# Patient Record
Sex: FEMALE | Race: WHITE | NOT HISPANIC OR LATINO | URBAN - METROPOLITAN AREA
[De-identification: names, ages, dates, MRNs, and addresses within clinical notes are randomized per-mention and may not be internally consistent; named-entity substitution may affect disease eponyms.]

---

## 2019-02-14 ENCOUNTER — INPATIENT (INPATIENT)
Facility: HOSPITAL | Age: 84
LOS: 8 days | Discharge: HOME | DRG: 377 | End: 2019-02-23
Attending: INTERNAL MEDICINE | Admitting: INTERNAL MEDICINE
Payer: MEDICARE

## 2019-02-14 VITALS
OXYGEN SATURATION: 96 % | DIASTOLIC BLOOD PRESSURE: 74 MMHG | HEART RATE: 80 BPM | TEMPERATURE: 98 F | SYSTOLIC BLOOD PRESSURE: 157 MMHG | RESPIRATION RATE: 20 BRPM

## 2019-02-14 DIAGNOSIS — R47.02 DYSPHASIA: ICD-10-CM

## 2019-02-14 LAB
ALBUMIN SERPL ELPH-MCNC: 3.3 G/DL — SIGNIFICANT CHANGE UP (ref 3.3–5)
ALP SERPL-CCNC: 324 U/L — HIGH (ref 40–120)
ALT FLD-CCNC: 32 U/L — SIGNIFICANT CHANGE UP (ref 10–45)
ANION GAP SERPL CALC-SCNC: 13 MMOL/L — SIGNIFICANT CHANGE UP (ref 5–17)
APPEARANCE UR: ABNORMAL
AST SERPL-CCNC: 53 U/L — HIGH (ref 10–40)
BASOPHILS # BLD AUTO: 0 K/UL — SIGNIFICANT CHANGE UP (ref 0–0.2)
BASOPHILS NFR BLD AUTO: 0 % — SIGNIFICANT CHANGE UP (ref 0–2)
BILIRUB SERPL-MCNC: 0.4 MG/DL — SIGNIFICANT CHANGE UP (ref 0.2–1.2)
BILIRUB UR-MCNC: NEGATIVE — SIGNIFICANT CHANGE UP
BUN SERPL-MCNC: 19 MG/DL — SIGNIFICANT CHANGE UP (ref 7–23)
CALCIUM SERPL-MCNC: 8.8 MG/DL — SIGNIFICANT CHANGE UP (ref 8.4–10.5)
CHLORIDE SERPL-SCNC: 104 MMOL/L — SIGNIFICANT CHANGE UP (ref 96–108)
CO2 SERPL-SCNC: 28 MMOL/L — SIGNIFICANT CHANGE UP (ref 22–31)
COLOR SPEC: YELLOW — SIGNIFICANT CHANGE UP
CREAT SERPL-MCNC: 0.79 MG/DL — SIGNIFICANT CHANGE UP (ref 0.5–1.3)
DIFF PNL FLD: NEGATIVE — SIGNIFICANT CHANGE UP
EOSINOPHIL # BLD AUTO: 0 K/UL — SIGNIFICANT CHANGE UP (ref 0–0.5)
EOSINOPHIL NFR BLD AUTO: 0.6 % — SIGNIFICANT CHANGE UP (ref 0–6)
GLUCOSE SERPL-MCNC: 109 MG/DL — HIGH (ref 70–99)
GLUCOSE UR QL: NEGATIVE — SIGNIFICANT CHANGE UP
HCT VFR BLD CALC: 34.4 % — LOW (ref 34.5–45)
HCT VFR BLD CALC: 38 % — SIGNIFICANT CHANGE UP (ref 34.5–45)
HGB BLD-MCNC: 11.7 G/DL — SIGNIFICANT CHANGE UP (ref 11.5–15.5)
HGB BLD-MCNC: 12.4 G/DL — SIGNIFICANT CHANGE UP (ref 11.5–15.5)
KETONES UR-MCNC: NEGATIVE — SIGNIFICANT CHANGE UP
LEUKOCYTE ESTERASE UR-ACNC: NEGATIVE — SIGNIFICANT CHANGE UP
LYMPHOCYTES # BLD AUTO: 0.6 K/UL — LOW (ref 1–3.3)
LYMPHOCYTES # BLD AUTO: 7.7 % — LOW (ref 13–44)
MAGNESIUM SERPL-MCNC: 1.8 MG/DL — SIGNIFICANT CHANGE UP (ref 1.6–2.6)
MCHC RBC-ENTMCNC: 31.7 PG — SIGNIFICANT CHANGE UP (ref 27–34)
MCHC RBC-ENTMCNC: 32.8 GM/DL — SIGNIFICANT CHANGE UP (ref 32–36)
MCHC RBC-ENTMCNC: 33.2 PG — SIGNIFICANT CHANGE UP (ref 27–34)
MCHC RBC-ENTMCNC: 33.9 GM/DL — SIGNIFICANT CHANGE UP (ref 32–36)
MCV RBC AUTO: 96.7 FL — SIGNIFICANT CHANGE UP (ref 80–100)
MCV RBC AUTO: 97.9 FL — SIGNIFICANT CHANGE UP (ref 80–100)
MONOCYTES # BLD AUTO: 0.4 K/UL — SIGNIFICANT CHANGE UP (ref 0–0.9)
MONOCYTES NFR BLD AUTO: 4.9 % — SIGNIFICANT CHANGE UP (ref 2–14)
NEUTROPHILS # BLD AUTO: 6.8 K/UL — SIGNIFICANT CHANGE UP (ref 1.8–7.4)
NEUTROPHILS NFR BLD AUTO: 86.8 % — HIGH (ref 43–77)
NITRITE UR-MCNC: NEGATIVE — SIGNIFICANT CHANGE UP
OB PNL STL: POSITIVE
PH UR: 6 — SIGNIFICANT CHANGE UP (ref 5–8)
PHOSPHATE SERPL-MCNC: 2.9 MG/DL — SIGNIFICANT CHANGE UP (ref 2.5–4.5)
PLATELET # BLD AUTO: 165 K/UL — SIGNIFICANT CHANGE UP (ref 150–400)
PLATELET # BLD AUTO: 210 K/UL — SIGNIFICANT CHANGE UP (ref 150–400)
POTASSIUM SERPL-MCNC: 3.9 MMOL/L — SIGNIFICANT CHANGE UP (ref 3.5–5.3)
POTASSIUM SERPL-SCNC: 3.9 MMOL/L — SIGNIFICANT CHANGE UP (ref 3.5–5.3)
PROT SERPL-MCNC: 6.8 G/DL — SIGNIFICANT CHANGE UP (ref 6–8.3)
PROT UR-MCNC: ABNORMAL
RBC # BLD: 3.51 M/UL — LOW (ref 3.8–5.2)
RBC # BLD: 3.93 M/UL — SIGNIFICANT CHANGE UP (ref 3.8–5.2)
RBC # FLD: 12.5 % — SIGNIFICANT CHANGE UP (ref 10.3–14.5)
RBC # FLD: 12.6 % — SIGNIFICANT CHANGE UP (ref 10.3–14.5)
SODIUM SERPL-SCNC: 145 MMOL/L — SIGNIFICANT CHANGE UP (ref 135–145)
SP GR SPEC: 1.03 — HIGH (ref 1.01–1.02)
T3 SERPL-MCNC: 51 NG/DL — LOW (ref 80–200)
T4 AB SER-ACNC: 7.1 UG/DL — SIGNIFICANT CHANGE UP (ref 4.6–12)
TSH SERPL-MCNC: 5.7 UIU/ML — HIGH (ref 0.27–4.2)
UROBILINOGEN FLD QL: ABNORMAL
WBC # BLD: 5 K/UL — SIGNIFICANT CHANGE UP (ref 3.8–10.5)
WBC # BLD: 7.8 K/UL — SIGNIFICANT CHANGE UP (ref 3.8–10.5)
WBC # FLD AUTO: 5 K/UL — SIGNIFICANT CHANGE UP (ref 3.8–10.5)
WBC # FLD AUTO: 7.8 K/UL — SIGNIFICANT CHANGE UP (ref 3.8–10.5)

## 2019-02-14 PROCEDURE — 93970 EXTREMITY STUDY: CPT | Mod: 26

## 2019-02-14 PROCEDURE — 99285 EMERGENCY DEPT VISIT HI MDM: CPT | Mod: 25

## 2019-02-14 PROCEDURE — 88312 SPECIAL STAINS GROUP 1: CPT | Mod: 26

## 2019-02-14 PROCEDURE — 71045 X-RAY EXAM CHEST 1 VIEW: CPT | Mod: 26

## 2019-02-14 PROCEDURE — 88305 TISSUE EXAM BY PATHOLOGIST: CPT | Mod: 26

## 2019-02-14 PROCEDURE — 71260 CT THORAX DX C+: CPT | Mod: 26

## 2019-02-14 PROCEDURE — 74177 CT ABD & PELVIS W/CONTRAST: CPT | Mod: 26

## 2019-02-14 RX ORDER — PANTOPRAZOLE SODIUM 20 MG/1
8 TABLET, DELAYED RELEASE ORAL
Qty: 80 | Refills: 0 | Status: DISCONTINUED | OUTPATIENT
Start: 2019-02-14 | End: 2019-02-19

## 2019-02-14 RX ORDER — SODIUM CHLORIDE 9 MG/ML
1000 INJECTION INTRAMUSCULAR; INTRAVENOUS; SUBCUTANEOUS ONCE
Qty: 0 | Refills: 0 | Status: COMPLETED | OUTPATIENT
Start: 2019-02-14 | End: 2019-02-14

## 2019-02-14 RX ORDER — NYSTATIN 500MM UNIT
500000 POWDER (EA) MISCELLANEOUS
Qty: 0 | Refills: 0 | Status: DISCONTINUED | OUTPATIENT
Start: 2019-02-14 | End: 2019-02-22

## 2019-02-14 RX ORDER — LEVOTHYROXINE SODIUM 125 MCG
50 TABLET ORAL DAILY
Qty: 0 | Refills: 0 | Status: DISCONTINUED | OUTPATIENT
Start: 2019-02-14 | End: 2019-02-23

## 2019-02-14 RX ORDER — SUCRALFATE 1 G
1 TABLET ORAL
Qty: 0 | Refills: 0 | Status: DISCONTINUED | OUTPATIENT
Start: 2019-02-14 | End: 2019-02-23

## 2019-02-14 RX ORDER — HEPARIN SODIUM 5000 [USP'U]/ML
5000 INJECTION INTRAVENOUS; SUBCUTANEOUS EVERY 12 HOURS
Qty: 0 | Refills: 0 | Status: DISCONTINUED | OUTPATIENT
Start: 2019-02-14 | End: 2019-02-23

## 2019-02-14 RX ORDER — PANTOPRAZOLE SODIUM 20 MG/1
80 TABLET, DELAYED RELEASE ORAL ONCE
Qty: 0 | Refills: 0 | Status: COMPLETED | OUTPATIENT
Start: 2019-02-14 | End: 2019-02-14

## 2019-02-14 RX ADMIN — Medication 1 GRAM(S): at 20:38

## 2019-02-14 RX ADMIN — PANTOPRAZOLE SODIUM 10 MG/HR: 20 TABLET, DELAYED RELEASE ORAL at 23:39

## 2019-02-14 RX ADMIN — SODIUM CHLORIDE 1000 MILLILITER(S): 9 INJECTION INTRAMUSCULAR; INTRAVENOUS; SUBCUTANEOUS at 03:46

## 2019-02-14 RX ADMIN — PANTOPRAZOLE SODIUM 10 MG/HR: 20 TABLET, DELAYED RELEASE ORAL at 10:54

## 2019-02-14 RX ADMIN — Medication 500000 UNIT(S): at 20:38

## 2019-02-14 RX ADMIN — HEPARIN SODIUM 5000 UNIT(S): 5000 INJECTION INTRAVENOUS; SUBCUTANEOUS at 20:38

## 2019-02-14 RX ADMIN — PANTOPRAZOLE SODIUM 80 MILLIGRAM(S): 20 TABLET, DELAYED RELEASE ORAL at 04:10

## 2019-02-14 NOTE — ED PROVIDER NOTE - PHYSICAL EXAMINATION
PGY1/MD Kennedy.   VITALS: reviewed  GEN: No apparent distress, A & O x 2, very thin lady,   HEAD/EYES: NC/AT, anicteric sclerae, +conjunctival pallor  ENT: mucus membranes moist, oropharynx WNL neck is supple  RESP: lungs CTA with equal breath sounds bilaterally, chest wall nontender and atraumatic  CV: heart with reg rhythm S1, S2, no murmur; distal pulses intact and symmetric bilaterally  ABDOMEN: normoactive bowel sounds, soft, nondistended, nontender, no palpable masses, + multiple surgical scars  MSK: extremities atraumatic and nontender, no edema, no asymmetry.   SKIN: warm, dry, no rash, no bruising, no cyanosis. color appropriate for ethnicity  NEURO: alert, mentating appropriately, no facial asymmetry.  PSYCH: Affect appropriate    Rectal Exam: no fissures, +hemorrhoids with no bleeding, +brown stool, no melena, no bright red blood. No remarkable tumor as far as finger's reach. small abrasion (not decubitus) is appreciated on left buttock. performed with [employer]

## 2019-02-14 NOTE — CONSULT NOTE ADULT - SUBJECTIVE AND OBJECTIVE BOX
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 Poor historian, information is from her son + his wife. Pt has been disoriented for the last 2 wks, yales and screams at night. Today, she started complaining difficulty swallowing and spitting out salivas (of note, the son says that the pt was able to drink water and took small breakfast this morning). Also, they notices that blood in toilet (not sure from urine or stool), which rashes her to the ED. Pt lost more than 10lbs over the last month.    PAST MEDICAL & SURGICAL HISTORY:  Stomach cancer: 2003  Kidney stone  Hypothyroid  History of ovarian cyst: 2009      MEDICATIONS  (STANDING):    MEDICATIONS  (PRN):      FAMILY HISTORY:      SOCIAL HISTORY:    [ ] Non-smoker  [ ] Smoker  [ ] Alcohol    Allergies    No Known Drug Allergies  shellfish (Anaphylaxis)    Intolerances    	    REVIEW OF SYSTEMS:  CONSTITUTIONAL: No fever, weight loss, or fatigue  EYES: No eye pain, visual disturbances, or discharge  ENT:  No difficulty hearing, tinnitus, vertigo; No sinus or throat pain  NECK: No pain or stiffness  RESPIRATORY: No cough, wheezing, chills or hemoptysis;+ Shortness of Breath  CARDIOVASCULAR: No chest pain, palpitations, passing out, dizziness, or leg swelling  GASTROINTESTINAL: No abdominal or epigastric pain. No nausea, vomiting, or hematemesis; No diarrhea or constipation. No melena or hematochezia.  GENITOURINARY: No dysuria, frequency, hematuria, or incontinence  NEUROLOGICAL: No headaches, memory loss, loss of strength, numbness, or tremors  SKIN: No itching, burning, rashes, or lesions   LYMPH Nodes: No enlarged glands  ENDOCRINE: No heat or cold intolerance; No hair loss  MUSCULOSKELETAL: No joint pain or swelling; No muscle, back, or extremity pain, + swelling  PSYCHIATRIC: No depression, anxiety, mood swings, or difficulty sleeping  HEME/LYMPH: No easy bruising, or bleeding gums  ALLERGY AND IMMUNOLOGIC: No hives or eczema	    [ ] All others negative	  [ ] Unable to obtain    PHYSICAL EXAM:  T(C): 36.8 (02-14-19 @ 06:19), Max: 36.8 (02-14-19 @ 01:38)  HR: 71 (02-14-19 @ 06:19) (71 - 80)  BP: 120/62 (02-14-19 @ 06:19) (120/62 - 157/74)  RR: 20 (02-14-19 @ 06:19) (20 - 20)  SpO2: 98% (02-14-19 @ 06:19) (96% - 98%)  Wt(kg): --  I&O's Summary      Appearance: Normal	  HEENT:   Normal oral mucosa, PERRL, EOMI	  Lymphatic: No lymphadenopathy  Cardiovascular: Normal S1 S2, No JVD,= murmurs,+ edema  Respiratory: Lungs clear to auscultation	  Psychiatry: A & O x 3, Mood & affect appropriate  Gastrointestinal:  Soft, Non-tender, + BS	  Skin: No rashes, No ecchymoses, No cyanosis	  Neurologic: Non-focal  Extremities: Normal range of motion, No clubbing, cyanosis .+  edema  Vascular: Peripheral pulses palpable 2+ bilaterally    TELEMETRY: 	    ECG:  	  RADIOLOGY:  OTHER: 	  	  LABS:	 	    CARDIAC MARKERS:                              12.4   7.8   )-----------( 210      ( 14 Feb 2019 03:31 )             38.0     02-14    145  |  104  |  19  ----------------------------<  109<H>  3.9   |  28  |  0.79    Ca    8.8      14 Feb 2019 03:31  Phos  2.9     02-14  Mg     1.8     02-14    TPro  6.8  /  Alb  3.3  /  TBili  0.4  /  DBili  x   /  AST  53<H>  /  ALT  32  /  AlkPhos  324<H>  02-14    proBNP: Serum Pro-Brain Natriuretic Peptide: 2551 pg/mL (02-14 @ 03:31)    Lipid Profile:   HgA1c:   TSH:       PREVIOUS DIAGNOSTIC TESTING:    < from: CT Head No Cont (09.28.16 @ 07:20) >  IMPRESSION: No CT evidence of acute intracranial hemorrhage, brain edema,   mass effect or acute territorial infarct. Soft tissue swelling of the   left parietal region. The underlying calvarium is intact.    < end of copied text > (14 Feb 2019 07:40)      PAST MEDICAL & SURGICAL HISTORY:  Stomach cancer: 2003  Kidney stone  Hypothyroid  History of ovarian cyst: 2009      MEDICATIONS  (STANDING):  heparin  Injectable 5000 Unit(s) SubCutaneous every 12 hours  levothyroxine 50 MICROGram(s) Oral daily  pantoprazole Infusion 8 mG/Hr (10 mL/Hr) IV Continuous <Continuous>      Allergies    No Known Drug Allergies  shellfish (Anaphylaxis)    Intolerances        SOCIAL HISTORY:  Denies ETOh,Smoking,     FAMILY HISTORY:      REVIEW OF SYSTEMS:    CONSTITUTIONAL: No weakness, fevers or chills  EYES/ENT: No visual changes;  No vertigo or throat pain   NECK: No pain or stiffness  RESPIRATORY: No cough, wheezing, hemoptysis; No shortness of breath  CARDIOVASCULAR: No chest pain or palpitations  GASTROINTESTINAL: No abdominal or epigastric pain. No nausea, vomiting, or hematemesis; No diarrhea or constipation. No melena or hematochezia.  GENITOURINARY: No dysuria, frequency or hematuria  NEUROLOGICAL: No numbness or weakness  SKIN: No itching, burning, rashes, or lesions   All other review of systems is negative unless indicated above.    VITAL:  T(C): , Max: 36.8 (02-14-19 @ 01:38)  T(F): , Max: 98.2 (02-14-19 @ 01:38)  HR: 81 (02-14-19 @ 08:25)  BP: 106/60 (02-14-19 @ 08:25)  BP(mean): --  RR: 18 (02-14-19 @ 08:25)  SpO2: 97% (02-14-19 @ 08:25)  Wt(kg): --    I and O's:        PHYSICAL EXAM:    Constitutional: NAD  HEENT: PERRLA,   Neck: No JVD  Respiratory: CTA B/L  Cardiovascular: S1 and S2  Gastrointestinal: BS+, soft, NT/ND  Extremities: No peripheral edema  Neurological: A/O x 3, no focal deficits  Psychiatric: Normal mood, normal affect  : No Duron  Skin: No rashes  Access: Not applicable  Back: No CVA tenderness    LABS:                        12.4   7.8   )-----------( 210      ( 14 Feb 2019 03:31 )             38.0     02-14    145  |  104  |  19  ----------------------------<  109<H>  3.9   |  28  |  0.79    Ca    8.8      14 Feb 2019 03:31  Phos  2.9     02-14  Mg     1.8     02-14    TPro  6.8  /  Alb  3.3  /  TBili  0.4  /  DBili  x   /  AST  53<H>  /  ALT  32  /  AlkPhos  324<H>  02-14          RADIOLOGY & ADDITIONAL STUDIES:

## 2019-02-14 NOTE — PRE-ANESTHESIA EVALUATION ADULT - NSANTHOSAYNRD_GEN_A_CORE
No. ZE screening performed.  STOP BANG Legend: 0-2 = LOW Risk; 3-4 = INTERMEDIATE Risk; 5-8 = HIGH Risk

## 2019-02-14 NOTE — CONSULT NOTE ADULT - ASSESSMENT
89 y/o female hx of gastric cancer and hypothyroid admitted with dysphagia/  acute GI bleed and weight loss     1- GI Bleed : monitor H/H   PPI IV   avoid NSAIDS     2- dysphagia : planned EGD     3- hypothyroid: cont meds   check TSH  free t4    4- Anemia : monitor for now     PAS 89 y/o female hx of gastric cancer and hypothyroid admitted with dysphagia/  acute GI bleed and weight loss     1- GI Bleed : monitor H/H   PPI IV   avoid NSAIDS     2- dysphagia : planned EGD     3- hypothyroid: cont meds   check TSH  free t4    4- Anemia : monitor for now     5- edema : likely thrid spacing however consdier VA duplex r/o DVT     PAS

## 2019-02-14 NOTE — ED ADULT NURSE REASSESSMENT NOTE - NS ED NURSE REASSESS COMMENT FT1
Pt straight cathed for residual urine with NELLIE Waddell present to confirm sterility. Pt tolerated procedure well. Small amount of dark urine drained. UA and culture sent to lab. Linens fixed and diaper changed. Family at bedside.

## 2019-02-14 NOTE — ED ADULT NURSE NOTE - OBJECTIVE STATEMENT
89 y/o female presents to ED via EMS from home c/o difficulty swallowing since 11:45pm last night. Pt arrived with family at bedside. A&O x 2, disoriented to time though she seems confused. Pt son says she has been c/o difficulty swallowing hours after dinner. She has a history of throat discomfort s/p radiation for stomach cancer years ago. Family says for last few days, she has been more lethargic and weak. Also reports one episode of blood in toilet paper in commode yesterday. They say the last time she urinated was Tuesday. pt live sat home with her 95 year old  who assists her with ADLs. Upon arrival, pt O2 stable. Repeatedly touching napkins to tongue saying "I cant swallow." Appears frail. +2 pitting edema in b/l extremities which family says is baseline for her, though moreso now due to pt not elevating her legs. 89 y/o female presents to ED via EMS from home c/o difficulty swallowing since 11:45pm last night. Pt arrived with family at bedside. A&O x 2, disoriented to time though is confused. Pt son says she has been c/o difficulty swallowing hours after dinner. She has a history of throat discomfort s/p radiation for stomach cancer years ago. Family says for last few days, she has been more lethargic and weak. Also reports one episode of blood on toilet paper in bedside commode yesterday. Unsure if it was from stool or urine. They say the last time she urinated was Tuesday. pt lives at home with her 95 year old  who assists her with ADLs. Upon arrival, pt O2 stable. Repeatedly touching napkins to tongue saying "I cant swallow." Denying chest pain, SOB, abdominal pain, n/v/d. Frail and cachexic appearing. +2 pitting edema in b/l extremities which family says is baseline for her, though more so recently due to pt not elevating her legs. Around 10mm stage 2 pressure ulcer noted to R buttock which family is aware of. No exudate or foul smell noted. Safety and comfort provided.

## 2019-02-14 NOTE — ED PROVIDER NOTE - OBJECTIVE STATEMENT
PGY1/MD Kennedy. 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 Poor historian, information is from her son + his wife. Pt has been disoriented for the last 2 wks, yales and screams at night. Today, she started complaining difficulty swallowing and spitting out salivas (of note, the son says that the pt can drink water). Also, they notices that blood in toilet (not sure from urine or stool), which rashes her to the ED. Pt lost more than 10lbs over the last month. PGY1/MD Kennedy. 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 Poor historian, information is from her son + his wife. Pt has been disoriented for the last 2 wks, yales and screams at night. Today, she started complaining difficulty swallowing and spitting out salivas (of note, the son says that the pt was able to drink water and took small breakfast this morning). Also, they notices that blood in toilet (not sure from urine or stool), which rashes her to the ED. Pt lost more than 10lbs over the last month. PGY1/MD Kennedy. 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 Poor historian, information is from her son + his wife. Pt has been disoriented for the last 2 wks, yales and screams at night. Today, she started complaining difficulty swallowing and spitting out salivas (of note, the son says that the pt was able to drink water and took small breakfast this morning). Also, they notices that blood in toilet (not sure from urine or stool), which rashes her to the ED. Pt lost more than 10lbs over the last month.

## 2019-02-14 NOTE — ED PROVIDER NOTE - CLINICAL SUMMARY MEDICAL DECISION MAKING FREE TEXT BOX
PGY1/MD Kennedy. 89 yo F with gastric cancer, 14 yrs ago, p/w decrased appetite x 2wks, delilium PGY1/MD Kennedy. 89 yo F with gastric cancer, 14 yrs ago, p/w decrased appetite x 2wks, dysphasia, delirium. No sings of choking or airway compromise. guanic, ua, cbc, cmp. Alvarez Mcrae DO: 89 yo F pmh gastric cancer, esophageal strictures, hypothyroidism with difficult swallowing and decreased appetite x 2 weeks. Associated with gradual cognitive  decline during same period of time. Family noted blood in toilet (unclear gu vs gi) which prompted ED visit. Pt awake in NAD. no focal deficit. dysphagia of unclear etiology, not tolerating po with wt loss per family. ?blood in stool Plan: labs, cxr. patient to be admitted for further management.

## 2019-02-14 NOTE — ED ADULT NURSE REASSESSMENT NOTE - NS ED NURSE REASSESS COMMENT FT1
Pt family left bedside. Pulled closer to nursing station to keep a closer eye on her for safety. pt currently sleeping. Awaiting bed upstairs.

## 2019-02-14 NOTE — ED ADULT NURSE NOTE - NSIMPLEMENTINTERV_GEN_ALL_ED
Implemented All Fall with Harm Risk Interventions:  [place] to call system. Call bell, personal items and telephone within reach. Instruct patient to call for assistance. Room bathroom lighting operational. Non-slip footwear when patient is off stretcher. Physically safe environment: no spills, clutter or unnecessary equipment. Stretcher in lowest position, wheels locked, appropriate side rails in place. Provide visual cue, wrist band, yellow gown, etc. Monitor gait and stability. Monitor for mental status changes and reorient to person, place, and time. Review medications for side effects contributing to fall risk. Reinforce activity limits and safety measures with patient and family. Provide visual clues: red socks.

## 2019-02-14 NOTE — H&P ADULT - HISTORY OF PRESENT ILLNESS
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 Poor historian, information is from her son + his wife. Pt has been disoriented for the last 2 wks, yales and screams at night. Today, she started complaining difficulty swallowing and spitting out salivas (of note, the son says that the pt was able to drink water and took small breakfast this morning). Also, they notices that blood in toilet (not sure from urine or stool), which rashes her to the ED. Pt lost more than 10lbs over the last month.    PAST MEDICAL & SURGICAL HISTORY:  Stomach cancer: 2003  Kidney stone  Hypothyroid  History of ovarian cyst: 2009      MEDICATIONS  (STANDING):    MEDICATIONS  (PRN):      FAMILY HISTORY:      SOCIAL HISTORY:    [ ] Non-smoker  [ ] Smoker  [ ] Alcohol    Allergies    No Known Drug Allergies  shellfish (Anaphylaxis)    Intolerances    	    REVIEW OF SYSTEMS:  CONSTITUTIONAL: No fever, weight loss, or fatigue  EYES: No eye pain, visual disturbances, or discharge  ENT:  No difficulty hearing, tinnitus, vertigo; No sinus or throat pain  NECK: No pain or stiffness  RESPIRATORY: No cough, wheezing, chills or hemoptysis;+ Shortness of Breath  CARDIOVASCULAR: No chest pain, palpitations, passing out, dizziness, or leg swelling  GASTROINTESTINAL: No abdominal or epigastric pain. No nausea, vomiting, or hematemesis; No diarrhea or constipation. No melena or hematochezia.  GENITOURINARY: No dysuria, frequency, hematuria, or incontinence  NEUROLOGICAL: No headaches, memory loss, loss of strength, numbness, or tremors  SKIN: No itching, burning, rashes, or lesions   LYMPH Nodes: No enlarged glands  ENDOCRINE: No heat or cold intolerance; No hair loss  MUSCULOSKELETAL: No joint pain or swelling; No muscle, back, or extremity pain, + swelling  PSYCHIATRIC: No depression, anxiety, mood swings, or difficulty sleeping  HEME/LYMPH: No easy bruising, or bleeding gums  ALLERGY AND IMMUNOLOGIC: No hives or eczema	    [ ] All others negative	  [ ] Unable to obtain    PHYSICAL EXAM:  T(C): 36.8 (02-14-19 @ 06:19), Max: 36.8 (02-14-19 @ 01:38)  HR: 71 (02-14-19 @ 06:19) (71 - 80)  BP: 120/62 (02-14-19 @ 06:19) (120/62 - 157/74)  RR: 20 (02-14-19 @ 06:19) (20 - 20)  SpO2: 98% (02-14-19 @ 06:19) (96% - 98%)  Wt(kg): --  I&O's Summary      Appearance: Normal	  HEENT:   Normal oral mucosa, PERRL, EOMI	  Lymphatic: No lymphadenopathy  Cardiovascular: Normal S1 S2, No JVD,= murmurs,+ edema  Respiratory: Lungs clear to auscultation	  Psychiatry: A & O x 3, Mood & affect appropriate  Gastrointestinal:  Soft, Non-tender, + BS	  Skin: No rashes, No ecchymoses, No cyanosis	  Neurologic: Non-focal  Extremities: Normal range of motion, No clubbing, cyanosis .+  edema  Vascular: Peripheral pulses palpable 2+ bilaterally    TELEMETRY: 	    ECG:  	  RADIOLOGY:  OTHER: 	  	  LABS:	 	    CARDIAC MARKERS:                              12.4   7.8   )-----------( 210      ( 14 Feb 2019 03:31 )             38.0     02-14    145  |  104  |  19  ----------------------------<  109<H>  3.9   |  28  |  0.79    Ca    8.8      14 Feb 2019 03:31  Phos  2.9     02-14  Mg     1.8     02-14    TPro  6.8  /  Alb  3.3  /  TBili  0.4  /  DBili  x   /  AST  53<H>  /  ALT  32  /  AlkPhos  324<H>  02-14    proBNP: Serum Pro-Brain Natriuretic Peptide: 2551 pg/mL (02-14 @ 03:31)    Lipid Profile:   HgA1c:   TSH:       PREVIOUS DIAGNOSTIC TESTING:    < from: CT Head No Cont (09.28.16 @ 07:20) >  IMPRESSION: No CT evidence of acute intracranial hemorrhage, brain edema,   mass effect or acute territorial infarct. Soft tissue swelling of the   left parietal region. The underlying calvarium is intact.    < end of copied text >

## 2019-02-14 NOTE — PROGRESS NOTE ADULT - SUBJECTIVE AND OBJECTIVE BOX
Pre-Endoscopy Evaluation      Referring Physician:  dr. miguelito delarosa                                  Procedure:  upper gastrointestinal endoscopy     Indication for Procedure: dysphagia    Pertinent History: 89y female with PMH of gastric cancer, s/p gastrectomy/XRT, esophagus stricture, Hypothyroidism p/w difficulty swallowing, decreased appetite x 2 weeks      Sedation by Anesthesia [x]    PAST MEDICAL & SURGICAL HISTORY:  Stomach cancer: 2003  Kidney stone  Hypothyroid  History of ovarian cyst: 2009  Dementia      PMH of Gastroparesis [ ]  Gastric Surgery [X]  Gastric Outlet Obstruction [ ]    Allergies:    No Known Drug Allergies  shellfish (Anaphylaxis)    Intolerances:    Latex allergy: [ ] yes [x] no    Medications:MEDICATIONS  (STANDING):  heparin  Injectable 5000 Unit(s) SubCutaneous every 12 hours  levothyroxine 50 MICROGram(s) Oral daily  pantoprazole Infusion 8 mG/Hr (10 mL/Hr) IV Continuous <Continuous>    MEDICATIONS  (PRN):      Smoking: [ ] yes  [x] no    AICD/PPM: [ ] yes   [x] no    Pertinent lab data:                        11.7   5.0   )-----------( 165      ( 14 Feb 2019 12:17 )             34.4     02-14    145  |  104  |  19  ----------------------------<  109<H>  3.9   |  28  |  0.79    Ca    8.8      14 Feb 2019 03:31  Phos  2.9     02-14  Mg     1.8     02-14    TPro  6.8  /  Alb  3.3  /  TBili  0.4  /  DBili  x   /  AST  53<H>  /  ALT  32  /  AlkPhos  324<H>  02-14          Physical Examination:       Daily   Vital Signs Last 24 Hrs  T(C): 36.4 (14 Feb 2019 08:25), Max: 36.8 (14 Feb 2019 01:38)  T(F): 97.5 (14 Feb 2019 08:25), Max: 98.2 (14 Feb 2019 01:38)  HR: 81 (14 Feb 2019 08:25) (70 - 81)  BP: 106/60 (14 Feb 2019 08:25) (106/60 - 157/74)  BP(mean): --  RR: 18 (14 Feb 2019 08:25) (18 - 20)  SpO2: 97% (14 Feb 2019 08:25) (96% - 98%)    < from: Transthoracic Echocardiogram w/ Doppler (01.06.09 @ 16:44) >    Fractional short: 36 %  Ejection Fraction: 66 %  ------------------------------------------------------------------------  OBSERVATIONS:  Mitral Valve: Mitral annular calcification. Mitral valve  prolapse involving the posterior mitral leaflet.  Aortic Valve/Aorta: Calcified trileaflet aortic valve with  normal opening.  Normal aortic root.  Left Atrium: Severe left atrial enlargement.  Left Ventricle: Normal left ventricular internal dimensions  and wall thickness.  Normal left ventricular systolic function. EF 66%.  Right Heart: Normal right atrium.  Normal right ventricular size and systolic function.  Normal tricuspid and pulmonic valves.  Pericardium/Pleura: Normal pericardium with no pericardial  effusion.  Doppler: Mild-moderate mitral regurgitation.  Mild-moderate aortic regurgitation.  Mild-moderate tricuspid regurgitation. Estimated pulmonary  artery systolic pressure equals 40 mm Hg, assuming right  atrial pressure equals 10  mm Hg, consistent with mild  pulmonary hypertension.  Mild pulmonic regurgitation.  ------------------------------------------------------------------------  Conclusions:  1. Mitral annular calcification. Mitral valve prolapse  involving the posterior mitral leaflet.  2. Calcified trileaflet aortic valve with normal opening.  3. Severe left atrial enlargement.  4. Normal left ventricular internal dimensions and wall  thickness.  5. Normal left ventricular systolic function. EF 66%.  6. Normal right atrium.  7. Normal right ventricular size andsystolic function.  8. Mild-moderate mitral regurgitation.  9. Mild-moderate aortic regurgitation.  10. Mild-moderate tricuspid regurgitation. Estimated  pulmonary artery systolic pressure equals 40 mm Hg,  assuming right atrial pressure equals 10  mm Hg, consistent  with mild pulmonary hypertension.  --------------------------------------------------------------------------------      Constitutional:      Neck:      Respiratory:    Cardiovascular:     Gastrointestinal:     Extremities:     Neurological:     :     Skin:     Comments:    ASA Class: I [ ]  II [ ]  III [ ]  IV [x]    The patient is a suitable candidate for the planned procedure unless box checked [ ]  No, explain:

## 2019-02-14 NOTE — CONSULT NOTE ADULT - SUBJECTIVE AND OBJECTIVE BOX
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 Poor historian, information is from her son + his wife. Pt has been disoriented for the last 2 wks, yales and screams at night. Today, she started complaining difficulty swallowing and spitting out salivas (of note, the son says that the pt was able to drink water and took small breakfast this morning). Also, they notices that blood in toilet (not sure from urine or stool), which rushed her to the ED. Pt lost more than 10lbs over the last month.    no melena   no ASA /NSAID use   no fever or chills  no CP   no cough   no hematuria     PAST MEDICAL & SURGICAL HISTORY:  Stomach cancer:   Kidney stone  Hypothyroid  History of ovarian cyst:       MEDICATIONS  (STANDING):  reviewed     MEDICATIONS  (PRN):      FAMILY HISTORY:      SOCIAL HISTORY:    [n ] Non-smoker  [ ] Smoker  [ ] Alcohol    Allergies    No Known Drug Allergies  shellfish (Anaphylaxis)    Intolerances    	    REVIEW OF SYSTEMS:  CONSTITUTIONAL: weight loss   EYES: No eye pain, visual disturbances, or discharge  ENT:  No difficulty hearing, tinnitus, vertigo; No sinus or throat pain  NECK: No pain or stiffness  RESPIRATORY: No cough, wheezing, chills or hemoptysis;+ Shortness of Breath  CARDIOVASCULAR: No chest pain, palpitations, passing out, dizziness, or leg swelling  GASTROINTESTINAL: No abdominal  No nausea, vomiting, or hematemesis  hematochezia   GENITOURINARY: No dysuria, frequency, hematuria, or incontinence  NEUROLOGICAL: No headaches, memory loss, loss of strength, numbness, or tremors      [ ] All others negative	  [ ] Unable to obtain    PHYSICAL EXAM:  T(C): 36.8 (19 @ 06:19), Max: 36.8 (19 @ 01:38)  HR: 71 (19 @ 06:19) (71 - 80)  BP: 120/62 (19 @ 06:19) (120/62 - 157/74)  RR: 20 (19 @ 06:19) (20 - 20)  SpO2: 98% (19 @ 06:19) (96% - 98%)  Wt(kg): --  I&O's Summary      Appearance: cachectic   HEENT:  NAD   Cardiovascular: RR R S1S2    Respiratory: Lungs clear to auscultation	  Psychiatry: A & O x 3, Mood & affect appropriate  Gastrointestinal:  Soft, Non-tender, + BS	  Neurologic: Non-focal  Extremities:no edmea     TELEMETRY: 	    ECG:  	  RADIOLOGY:  OTHER: 	  	  LABS:	 	    CARDIAC MARKERS:                              12.4   7.8   )-----------( 210      ( 2019 03:31 )             38.0         145  |  104  |  19  ----------------------------<  109<H>  3.9   |  28  |  0.79    Ca    8.8      2019 03:31  Phos  2.9       Mg     1.8         TPro  6.8  /  Alb  3.3  /  TBili  0.4  /  DBili  x   /  AST  53<H>  /  ALT  32  /  AlkPhos  324<H>      proBNP: Serum Pro-Brain Natriuretic Peptide: 2551 pg/mL ( @ 03:31)    Lipid Profile:   HgA1c:   TSH:       PREVIOUS DIAGNOSTIC TESTING:    < from: CT Head No Cont (16 @ 07:20) >  IMPRESSION: No CT evidence of acute intracranial hemorrhage, brain edema,   mass effect or acute territorial infarct. Soft tissue swelling of the   left parietal region. The underlying calvarium is intact.    < end of copied text > (2019 07:40)      REVIEW OF SYSTEMS:    CONSTITUTIONAL: No weakness, fevers or chills  EYES/ENT: No visual changes;  No vertigo or throat pain   NECK: No pain or stiffness  RESPIRATORY: No cough, wheezing, hemoptysis; No shortness of breath  CARDIOVASCULAR: No chest pain or palpitations  GASTROINTESTINAL: No abdominal or epigastric pain. No nausea, vomiting, or hematemesis; No diarrhea or constipation. No melena or hematochezia.  GENITOURINARY: No dysuria, frequency or hematuria  NEUROLOGICAL: No numbness or weakness  SKIN: No itching, burning, rashes, or lesions   All other review of systems is negative unless indicated above.      Medications:   MEDICATIONS  (STANDING):  heparin  Injectable 5000 Unit(s) SubCutaneous every 12 hours  levothyroxine 50 MICROGram(s) Oral daily  nystatin    Suspension 055817 Unit(s) Oral four times a day  pantoprazole Infusion 8 mG/Hr (10 mL/Hr) IV Continuous <Continuous>  sucralfate suspension 1 Gram(s) Oral four times a day    MEDICATIONS  (PRN):      Allergies    No Known Drug Allergies  shellfish (Anaphylaxis)    Intolerances        PAST MEDICAL & SURGICAL HISTORY:  Stomach cancer:   Kidney stone  Hypothyroid  History of ovarian cyst:       Social :    No smoking       No ETOH use            Vital Signs Last 24 Hrs  T(C): 36.4 (2019 08:25), Max: 36.8 (2019 01:38)  T(F): 97.5 (2019 08:25), Max: 98.2 (2019 01:38)  HR: 81 (2019 08:25) (70 - 81)  BP: 106/60 (2019 08:25) (106/60 - 157/74)  BP(mean): --  RR: 18 (2019 08:25) (18 - 20)  SpO2: 97% (2019 08:25) (96% - 98%)  CAPILLARY BLOOD GLUCOSE            Physical Exam:    Daily Height in cm: 160.02 (2019 15:48)    Daily   General:  Well appearing, NAD, not cachetic  HEENT:  Nonicteric, PERRLA  CV:  RRR, no murmur, no JVD  Lungs:  CTA B/L, no wheezes, rales, rhonchi  Abdomen:  Soft, non-tender, no distended, positive BS, no hepatosplenomegaly  Extremities:  2+ pulses, no c/c, no edema  Skin:  Warm and dry, no rashes  :  No mendez  Neuro:  AAOx3, non-focal, CN II-XII grossly intact  No Restraints    LABS:                        11.7   5.0   )-----------( 165      ( 2019 12:17 )             34.4     02-14    145  |  104  |  19  ----------------------------<  109<H>  3.9   |  28  |  0.79    Ca    8.8      2019 03:31  Phos  2.9     -  Mg     1.8     -    TPro  6.8  /  Alb  3.3  /  TBili  0.4  /  DBili  x   /  AST  53<H>  /  ALT  32  /  AlkPhos  324<H>        Urinalysis Basic - ( 2019 03:45 )    Color: Yellow / Appearance: Slightly Turbid / S.027 / pH: x  Gluc: x / Ketone: Negative  / Bili: Negative / Urobili: 2 mg/dL   Blood: x / Protein: 30 mg/dL / Nitrite: Negative   Leuk Esterase: Negative / RBC: 4 /hpf / WBC 2 /HPF   Sq Epi: x / Non Sq Epi: 3 /hpf / Bacteria: Negative HPI:  CHIEF COMPLAINT:Patient is a 89y old  Female who presents with a chief complaint of failure to thrive/le edema    HPI:  89 yo F with PMH of gastric cancer, s/p gastrectomy + radiology (14 yrs ago), 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 Poor historian, information is from her son + his wife. Pt has been disoriented for the last 2 wks, yales and screams at night. Today, she started complaining difficulty swallowing and spitting out salivas (of note, the son says that the pt was able to drink water and took small breakfast this morning). Also, they notices that blood in toilet (not sure from urine or stool), which rushed her to the ED. Pt lost more than 10lbs over the last month.    no melena   no ASA /NSAID use   no fever or chills  no CP   no cough   no hematuria     PAST MEDICAL & SURGICAL HISTORY:  Stomach cancer:   Kidney stone  Hypothyroid  History of ovarian cyst:       MEDICATIONS  (STANDING):  reviewed     MEDICATIONS  (PRN):      FAMILY HISTORY:      SOCIAL HISTORY:    [n ] Non-smoker  [ ] Smoker  [ ] Alcohol    Allergies    No Known Drug Allergies  shellfish (Anaphylaxis)    Intolerances    	    REVIEW OF SYSTEMS:  CONSTITUTIONAL: weight loss   EYES: No eye pain, visual disturbances, or discharge  ENT:  No difficulty hearing, tinnitus, vertigo; No sinus or throat pain  NECK: No pain or stiffness  RESPIRATORY: No cough, wheezing, chills or hemoptysis;+ Shortness of Breath  CARDIOVASCULAR: No chest pain, palpitations, passing out, dizziness, or leg swelling  GASTROINTESTINAL: No abdominal  No nausea, vomiting, or hematemesis  hematochezia   GENITOURINARY: No dysuria, frequency, hematuria, or incontinence  NEUROLOGICAL: No headaches, memory loss, loss of strength, numbness, or tremors      [ ] All others negative	  [ ] Unable to obtain    PHYSICAL EXAM:  T(C): 36.8 (19 @ 06:19), Max: 36.8 (19 @ 01:38)  HR: 71 (19 @ 06:19) (71 - 80)  BP: 120/62 (19 @ 06:19) (120/62 - 157/74)  RR: 20 (19 @ 06:19) (20 - 20)  SpO2: 98% (19 @ 06:19) (96% - 98%)  Wt(kg): --  I&O's Summary      Appearance: cachectic   HEENT:  NAD   Cardiovascular: RR R S1S2    Respiratory: Lungs clear to auscultation	  Psychiatry: A & O x 3, Mood & affect appropriate  Gastrointestinal:  Soft, Non-tender, + BS	  Neurologic: Non-focal  Extremities:no edmea     TELEMETRY: 	    ECG:  	  RADIOLOGY:  OTHER: 	  	  LABS:	 	    CARDIAC MARKERS:                              12.4   7.8   )-----------( 210      ( 2019 03:31 )             38.0         145  |  104  |  19  ----------------------------<  109<H>  3.9   |  28  |  0.79    Ca    8.8      2019 03:31  Phos  2.9       Mg     1.8         TPro  6.8  /  Alb  3.3  /  TBili  0.4  /  DBili  x   /  AST  53<H>  /  ALT  32  /  AlkPhos  324<H>      proBNP: Serum Pro-Brain Natriuretic Peptide: 2551 pg/mL ( @ 03:31)    Lipid Profile:   HgA1c:   TSH:       PREVIOUS DIAGNOSTIC TESTING:    < from: CT Head No Cont (16 @ 07:20) >  IMPRESSION: No CT evidence of acute intracranial hemorrhage, brain edema,   mass effect or acute territorial infarct. Soft tissue swelling of the   left parietal region. The underlying calvarium is intact.    < end of copied text > (2019 07:40)      REVIEW OF SYSTEMS:    CONSTITUTIONAL: No weakness, fevers or chills  EYES/ENT: No visual changes;  No vertigo or throat pain   NECK: No pain or stiffness  RESPIRATORY: No cough, wheezing, hemoptysis; No shortness of breath  CARDIOVASCULAR: No chest pain or palpitations  GASTROINTESTINAL: No abdominal or epigastric pain. No nausea, vomiting, or hematemesis; No diarrhea or constipation. No melena or hematochezia.  GENITOURINARY: No dysuria, frequency or hematuria  NEUROLOGICAL: No numbness or weakness  SKIN: No itching, burning, rashes, or lesions   All other review of systems is negative unless indicated above.      Medications:   MEDICATIONS  (STANDING):  heparin  Injectable 5000 Unit(s) SubCutaneous every 12 hours  levothyroxine 50 MICROGram(s) Oral daily  nystatin    Suspension 259377 Unit(s) Oral four times a day  pantoprazole Infusion 8 mG/Hr (10 mL/Hr) IV Continuous <Continuous>  sucralfate suspension 1 Gram(s) Oral four times a day    MEDICATIONS  (PRN):      Allergies    No Known Drug Allergies  shellfish (Anaphylaxis)    Intolerances        PAST MEDICAL & SURGICAL HISTORY:  Stomach cancer:   Kidney stone  Hypothyroid  History of ovarian cyst:       Social :    No smoking       No ETOH use            Vital Signs Last 24 Hrs  T(C): 36.4 (2019 08:25), Max: 36.8 (2019 01:38)  T(F): 97.5 (2019 08:25), Max: 98.2 (2019 01:38)  HR: 81 (2019 08:25) (70 - 81)  BP: 106/60 (2019 08:25) (106/60 - 157/74)  BP(mean): --  RR: 18 (2019 08:25) (18 - 20)  SpO2: 97% (2019 08:25) (96% - 98%)  CAPILLARY BLOOD GLUCOSE            Physical Exam:    Daily Height in cm: 160.02 (2019 15:48)    Daily   General:  Well appearing, NAD, not cachetic  HEENT:  Nonicteric, PERRLA  CV:  RRR, no murmur, no JVD  Lungs:  CTA B/L, no wheezes, rales, rhonchi  Abdomen:  Soft, non-tender, no distended, positive BS, no hepatosplenomegaly  Extremities: edema      LABS:                        11.7   5.0   )-----------( 165      ( 2019 12:17 )             34.4     02-14    145  |  104  |  19  ----------------------------<  109<H>  3.9   |  28  |  0.79    Ca    8.8      2019 03:31  Phos  2.9     02-14  Mg     1.8     02-14    TPro  6.8  /  Alb  3.3  /  TBili  0.4  /  DBili  x   /  AST  53<H>  /  ALT  32  /  AlkPhos  324<H>  02-14      Urinalysis Basic - ( 2019 03:45 )    Color: Yellow / Appearance: Slightly Turbid / S.027 / pH: x  Gluc: x / Ketone: Negative  / Bili: Negative / Urobili: 2 mg/dL   Blood: x / Protein: 30 mg/dL / Nitrite: Negative   Leuk Esterase: Negative / RBC: 4 /hpf / WBC 2 /HPF   Sq Epi: x / Non Sq Epi: 3 /hpf / Bacteria: Negative

## 2019-02-14 NOTE — ED PROVIDER NOTE - NS ED ROS FT
PGY1/MD Kennedy.   CONST: no fevers, no chills, no trauma  ENT: no sore throat, no epistaxis, no rhinorrhea, no hearing changes  CV: no chest pain, no palpitations, no orthopnea, no extremity pain or swelling  RESP: no shortness of breath, no cough, no sputum, no pleurisy, no wheezing  ABD: no abdominal pain, no nausea, no vomiting, no diarrhea  : no dysuria, ?hematuria, no frequency, no urgency  MSK: no back pain, no neck pain, no extremity pain  NEURO: no headache, no dizziness  HEME: no easy bleeding or bruising  SKIN: no diaphoresis, no rash

## 2019-02-14 NOTE — ED PROVIDER NOTE - CARE PLAN
Principal Discharge DX:	Gastrointestinal hemorrhage, unspecified gastrointestinal hemorrhage type Principal Discharge DX:	Dysphasia  Secondary Diagnosis:	Gastrointestinal hemorrhage, unspecified gastrointestinal hemorrhage type  Secondary Diagnosis:	Failure to thrive in adult

## 2019-02-14 NOTE — ED PROVIDER NOTE - SECONDARY DIAGNOSIS.
Gastrointestinal hemorrhage, unspecified gastrointestinal hemorrhage type Failure to thrive in adult

## 2019-02-15 LAB
ALBUMIN SERPL ELPH-MCNC: 2.9 G/DL — LOW (ref 3.3–5)
ALP SERPL-CCNC: 268 U/L — HIGH (ref 40–120)
ALT FLD-CCNC: 25 U/L — SIGNIFICANT CHANGE UP (ref 10–45)
ANION GAP SERPL CALC-SCNC: 15 MMOL/L — SIGNIFICANT CHANGE UP (ref 5–17)
AST SERPL-CCNC: 35 U/L — SIGNIFICANT CHANGE UP (ref 10–40)
BILIRUB SERPL-MCNC: 0.5 MG/DL — SIGNIFICANT CHANGE UP (ref 0.2–1.2)
BLD GP AB SCN SERPL QL: NEGATIVE — SIGNIFICANT CHANGE UP
BUN SERPL-MCNC: 24 MG/DL — HIGH (ref 7–23)
CALCIUM SERPL-MCNC: 8.7 MG/DL — SIGNIFICANT CHANGE UP (ref 8.4–10.5)
CHLORIDE SERPL-SCNC: 106 MMOL/L — SIGNIFICANT CHANGE UP (ref 96–108)
CO2 SERPL-SCNC: 22 MMOL/L — SIGNIFICANT CHANGE UP (ref 22–31)
CREAT SERPL-MCNC: 0.81 MG/DL — SIGNIFICANT CHANGE UP (ref 0.5–1.3)
CULTURE RESULTS: NO GROWTH — SIGNIFICANT CHANGE UP
GLUCOSE BLDC GLUCOMTR-MCNC: 79 MG/DL — SIGNIFICANT CHANGE UP (ref 70–99)
GLUCOSE SERPL-MCNC: 72 MG/DL — SIGNIFICANT CHANGE UP (ref 70–99)
HCT VFR BLD CALC: 40.1 % — SIGNIFICANT CHANGE UP (ref 34.5–45)
HGB BLD-MCNC: 12.8 G/DL — SIGNIFICANT CHANGE UP (ref 11.5–15.5)
MCHC RBC-ENTMCNC: 31 PG — SIGNIFICANT CHANGE UP (ref 27–34)
MCHC RBC-ENTMCNC: 32 GM/DL — SIGNIFICANT CHANGE UP (ref 32–36)
MCV RBC AUTO: 97.1 FL — SIGNIFICANT CHANGE UP (ref 80–100)
PLATELET # BLD AUTO: 211 K/UL — SIGNIFICANT CHANGE UP (ref 150–400)
POTASSIUM SERPL-MCNC: 3.8 MMOL/L — SIGNIFICANT CHANGE UP (ref 3.5–5.3)
POTASSIUM SERPL-SCNC: 3.8 MMOL/L — SIGNIFICANT CHANGE UP (ref 3.5–5.3)
PROT SERPL-MCNC: 6 G/DL — SIGNIFICANT CHANGE UP (ref 6–8.3)
RBC # BLD: 4.13 M/UL — SIGNIFICANT CHANGE UP (ref 3.8–5.2)
RBC # FLD: 13.1 % — SIGNIFICANT CHANGE UP (ref 10.3–14.5)
RH IG SCN BLD-IMP: NEGATIVE — SIGNIFICANT CHANGE UP
SODIUM SERPL-SCNC: 143 MMOL/L — SIGNIFICANT CHANGE UP (ref 135–145)
SPECIMEN SOURCE: SIGNIFICANT CHANGE UP
SURGICAL PATHOLOGY STUDY: SIGNIFICANT CHANGE UP
WBC # BLD: 6.7 K/UL — SIGNIFICANT CHANGE UP (ref 3.8–10.5)
WBC # FLD AUTO: 6.7 K/UL — SIGNIFICANT CHANGE UP (ref 3.8–10.5)

## 2019-02-15 PROCEDURE — 74220 X-RAY XM ESOPHAGUS 1CNTRST: CPT | Mod: 26

## 2019-02-15 RX ORDER — SENNA PLUS 8.6 MG/1
2 TABLET ORAL AT BEDTIME
Qty: 0 | Refills: 0 | Status: DISCONTINUED | OUTPATIENT
Start: 2019-02-15 | End: 2019-02-23

## 2019-02-15 RX ORDER — SODIUM CHLORIDE 9 MG/ML
1000 INJECTION INTRAMUSCULAR; INTRAVENOUS; SUBCUTANEOUS
Qty: 0 | Refills: 0 | Status: DISCONTINUED | OUTPATIENT
Start: 2019-02-15 | End: 2019-02-17

## 2019-02-15 RX ADMIN — Medication 500000 UNIT(S): at 23:50

## 2019-02-15 RX ADMIN — SODIUM CHLORIDE 50 MILLILITER(S): 9 INJECTION INTRAMUSCULAR; INTRAVENOUS; SUBCUTANEOUS at 10:21

## 2019-02-15 RX ADMIN — Medication 500000 UNIT(S): at 05:03

## 2019-02-15 RX ADMIN — HEPARIN SODIUM 5000 UNIT(S): 5000 INJECTION INTRAVENOUS; SUBCUTANEOUS at 05:03

## 2019-02-15 RX ADMIN — HEPARIN SODIUM 5000 UNIT(S): 5000 INJECTION INTRAVENOUS; SUBCUTANEOUS at 18:06

## 2019-02-15 RX ADMIN — PANTOPRAZOLE SODIUM 10 MG/HR: 20 TABLET, DELAYED RELEASE ORAL at 11:23

## 2019-02-15 RX ADMIN — Medication 50 MICROGRAM(S): at 05:03

## 2019-02-15 RX ADMIN — Medication 1 GRAM(S): at 12:40

## 2019-02-15 RX ADMIN — Medication 1 GRAM(S): at 23:50

## 2019-02-15 RX ADMIN — Medication 1 GRAM(S): at 05:03

## 2019-02-15 RX ADMIN — Medication 500000 UNIT(S): at 18:06

## 2019-02-15 RX ADMIN — Medication 500000 UNIT(S): at 11:24

## 2019-02-15 RX ADMIN — PANTOPRAZOLE SODIUM 10 MG/HR: 20 TABLET, DELAYED RELEASE ORAL at 22:37

## 2019-02-15 RX ADMIN — Medication 1 GRAM(S): at 18:06

## 2019-02-15 NOTE — PROGRESS NOTE ADULT - SUBJECTIVE AND OBJECTIVE BOX
INTERVAL HPI/OVERNIGHT EVENTS: feels better, tolerated PO, barium study today    MEDICATIONS  (STANDING):  heparin  Injectable 5000 Unit(s) SubCutaneous every 12 hours  levothyroxine 50 MICROGram(s) Oral daily  nystatin    Suspension 742084 Unit(s) Oral four times a day  pantoprazole Infusion 8 mG/Hr (10 mL/Hr) IV Continuous <Continuous>  sucralfate suspension 1 Gram(s) Oral four times a day    MEDICATIONS  (PRN):      Allergies    No Known Drug Allergies  shellfish (Anaphylaxis)    Intolerances            PHYSICAL EXAM:   Vital Signs:  Vital Signs Last 24 Hrs  T(C): 36.2 (15 Feb 2019 04:58), Max: 36.5 (2019 08:07)  T(F): 97.2 (15 Feb 2019 04:58), Max: 97.7 (2019 08:07)  HR: 76 (15 Feb 2019 04:58) (66 - 81)  BP: 128/77 (15 Feb 2019 04:58) (106/60 - 128/77)  BP(mean): --  RR: 18 (15 Feb 2019 04:58) (18 - 18)  SpO2: 97% (15 Feb 2019 04:58) (97% - 97%)  Daily Height in cm: 147.32 (2019 18:41)    Daily     GENERAL:  no distress  HEENT:  NC/AT,  anicteric  CHEST:   no increased effort, breath sounds clear  HEART:  Regular rhythm  ABDOMEN:  Soft, non-tender, non-distended, normoactive bowel sounds,  no masses ,no hepato-splenomegaly, no signs of chronic liver disease  EXTEREMITIES:  no cyanosis      LABS:                        11.7   5.0   )-----------( 165      ( 2019 12:17 )             34.4     02-15    143  |  106  |  24<H>  ----------------------------<  72  3.8   |  22  |  0.81    Ca    8.7      15 Feb 2019 06:52  Phos  2.9     02-14  Mg     1.8     02-14    TPro  6.0  /  Alb  2.9<L>  /  TBili  0.5  /  DBili  x   /  AST  35  /  ALT  25  /  AlkPhos  268<H>  02-15      Urinalysis Basic - ( 2019 03:45 )    Color: Yellow / Appearance: Slightly Turbid / S.027 / pH: x  Gluc: x / Ketone: Negative  / Bili: Negative / Urobili: 2 mg/dL   Blood: x / Protein: 30 mg/dL / Nitrite: Negative   Leuk Esterase: Negative / RBC: 4 /hpf / WBC 2 /HPF   Sq Epi: x / Non Sq Epi: 3 /hpf / Bacteria: Negative        RADIOLOGY & ADDITIONAL TESTS:

## 2019-02-15 NOTE — DIETITIAN INITIAL EVALUATION ADULT. - OTHER INFO
Nutrition consult received for poor PO intake. As per RN, pt with poor appetite and PO intake, reports pt tolerating pureed diet, denies pt with nausea, vomiting, diarrhea, or constipation, reports last BM PTA. Discussed nutritional supplementation with NP, agreed for Ensure Pudding TID.

## 2019-02-15 NOTE — DISCHARGE NOTE ADULT - MEDICATION SUMMARY - MEDICATIONS TO STOP TAKING
I will STOP taking the medications listed below when I get home from the hospital:    metoclopramide 5 mg oral tablet  -- 1 tab(s) by mouth 2 times a day    Keflex 500 mg oral capsule  -- 1 cap(s) by mouth 2 times a day  -- Finish all this medication unless otherwise directed by prescriber.

## 2019-02-15 NOTE — DISCHARGE NOTE ADULT - INSTRUCTIONS
dysphagia 1 with nectar thick liquid , ensure pudding 3 cans daily-aspiration precaution follow up with MD.

## 2019-02-15 NOTE — CHART NOTE - NSCHARTNOTEFT_GEN_A_CORE
Upon Nutritional Assessment by the Registered Dietitian your patient was determined to meet criteria / has evidence of the following diagnosis/diagnoses:          [ ]  Mild Protein Calorie Malnutrition        [ ]  Moderate Protein Calorie Malnutrition        [x] Severe Protein Calorie Malnutrition        [ ] Unspecified Protein Calorie Malnutrition        [ ] Underweight / BMI <19        [ ] Morbid Obesity / BMI > 40      Findings as based on:  [x] Comprehensive nutrition assessment   [x] Nutrition Focused Physical Exam:  Unable to obtain pt's consent to perform Nutrition Focused Physical Exam due to AMS, however noted visual signs of severe muscle loss in temporales, clavicles, and shoulders and severe fat loss in triceps and ribs.  [x] Other: PO intake <75% and 10 pounds weight loss x >1 month; fluid accumulation    Nutrition Plan/Recommendations:    1. Recommend continue current diet. Defer diet/fluid consistencies to medical team/SLP recommendations.   2. Recommend Ensure Pudding TID (510 kcal and 12 g protein) to optimize protein intake.  3. Encourage PO intake, obtain food preferences, provide feeding assistance as needed.  4. Recommend Multivitamin and Vitamin C to help with pressure ulcer healing.  5. Obtain current weight to identify changes if any.     RD remains available,   Martha Alcaraz MS, RDN, #[phone]     PROVIDER Section:     By signing this assessment you are acknowledging and agree with the diagnosis/diagnoses assigned by the Registered Dietitian    Comments: Upon Nutritional Assessment by the Registered Dietitian your patient was determined to meet criteria / has evidence of the following diagnosis/diagnoses:          [ ]  Mild Protein Calorie Malnutrition        [ ]  Moderate Protein Calorie Malnutrition        [x] Severe Protein Calorie Malnutrition        [ ] Unspecified Protein Calorie Malnutrition        [ ] Underweight / BMI <19        [ ] Morbid Obesity / BMI > 40      Findings as based on:  [x] Comprehensive nutrition assessment   [x] Nutrition Focused Physical Exam:  Unable to obtain pt's consent to perform Nutrition Focused Physical Exam due to AMS, however noted visual signs of severe muscle loss in temporales, clavicles, and shoulders and severe fat loss in triceps and ribs.  [x] Other: PO intake <75% and 10 pounds weight loss x >1 month; fluid accumulation    Nutrition Plan/Recommendations:    1. Recommend continue dysphagia 1, pureed + nectar thickened liquids. Defer diet/fluid consistencies to medical team/SLP recommendations.   2. Recommend Ensure Pudding TID (510 kcal and 12 g protein) to optimize protein intake.  3. Encourage PO intake, obtain food preferences, provide feeding assistance as needed.  4. Recommend Multivitamin and Vitamin C to help with pressure ulcer healing.  5. Obtain current weight to identify changes if any.     RD remains available,   Martha Alcaraz, MS, RDN, #[phone]     PROVIDER Section:     By signing this assessment you are acknowledging and agree with the diagnosis/diagnoses assigned by the Registered Dietitian    Comments:

## 2019-02-15 NOTE — PROGRESS NOTE ADULT - SUBJECTIVE AND OBJECTIVE BOX
CARDIOLOGY     PROGRESS  NOTE   ________________________________________________    CHIEF COMPLAINT:Patient is a 89y old  Female who presents with a chief complaint of le edema/failure to thrive (15 Feb 2019 07:55)  no complain.  	  REVIEW OF SYSTEMS:  CONSTITUTIONAL: No fever, weight loss, or fatigue  EYES: No eye pain, visual disturbances, or discharge  ENT:  No difficulty hearing, tinnitus, vertigo; No sinus or throat pain  NECK: No pain or stiffness  RESPIRATORY: No cough, wheezing, chills or hemoptysis; No Shortness of Breath  CARDIOVASCULAR: No chest pain, palpitations, passing out, dizziness, or leg swelling  GASTROINTESTINAL: No abdominal or epigastric pain. No nausea, vomiting, or hematemesis; No diarrhea or constipation. No melena or hematochezia.  GENITOURINARY: No dysuria, frequency, hematuria, or incontinence  NEUROLOGICAL: No headaches, memory loss, loss of strength, numbness, or tremors  SKIN: No itching, burning, rashes, or lesions   LYMPH Nodes: No enlarged glands  ENDOCRINE: No heat or cold intolerance; No hair loss  MUSCULOSKELETAL: No joint pain or swelling; No muscle, back, or extremity pain  PSYCHIATRIC: No depression, anxiety, mood swings, or difficulty sleeping  HEME/LYMPH: No easy bruising, or bleeding gums  ALLERGY AND IMMUNOLOGIC: No hives or eczema	    [ ] All others negative	  [ ] Unable to obtain    PHYSICAL EXAM:  T(C): 36.2 (02-15-19 @ 04:58), Max: 36.4 (02-14-19 @ 08:25)  HR: 76 (02-15-19 @ 04:58) (66 - 81)  BP: 128/77 (02-15-19 @ 04:58) (106/60 - 128/77)  RR: 18 (02-15-19 @ 04:58) (18 - 18)  SpO2: 97% (02-15-19 @ 04:58) (97% - 97%)  Wt(kg): --  I&O's Summary    14 Feb 2019 07:01  -  15 Feb 2019 07:00  --------------------------------------------------------  IN: 480 mL / OUT: 250 mL / NET: 230 mL        Appearance: Normal	  HEENT:   Normal oral mucosa, PERRL, EOMI	  Lymphatic: No lymphadenopathy  Cardiovascular: Normal S1 S2, No JVD, No murmurs, No edema  Respiratory: Lungs clear to auscultation	  Psychiatry: A & O x 3, Mood & affect appropriate  Gastrointestinal:  Soft, Non-tender, + BS	  Skin: No rashes, No ecchymoses, No cyanosis	  Neurologic: Non-focal  Extremities: Normal range of motion, No clubbing, cyanosis or edema  Vascular: Peripheral pulses palpable 2+ bilaterally    MEDICATIONS  (STANDING):  heparin  Injectable 5000 Unit(s) SubCutaneous every 12 hours  levothyroxine 50 MICROGram(s) Oral daily  nystatin    Suspension 674964 Unit(s) Oral four times a day  pantoprazole Infusion 8 mG/Hr (10 mL/Hr) IV Continuous <Continuous>  sucralfate suspension 1 Gram(s) Oral four times a day      TELEMETRY: 	    ECG:  	  RADIOLOGY:  OTHER: 	  	  LABS:	 	    CARDIAC MARKERS:                                11.7   5.0   )-----------( 165      ( 14 Feb 2019 12:17 )             34.4     02-15    143  |  106  |  24<H>  ----------------------------<  72  3.8   |  22  |  0.81    Ca    8.7      15 Feb 2019 06:52  Phos  2.9     02-14  Mg     1.8     02-14    TPro  6.0  /  Alb  2.9<L>  /  TBili  0.5  /  DBili  x   /  AST  35  /  ALT  25  /  AlkPhos  268<H>  02-15    proBNP: Serum Pro-Brain Natriuretic Peptide: 2551 pg/mL (02-14 @ 03:31)    Lipid Profile:   HgA1c:   TSH: Thyroid Stimulating Hormone, Serum: 5.70 uIU/mL (02-14 @ 05:28)  < from: Transthoracic Echocardiogram w/ Doppler (01.06.09 @ 16:44) >  1. Mitral annular calcification. Mitral valve prolapse  involving the posterior mitral leaflet.  2. Calcified trileaflet aortic valve with normal opening.  3. Severe left atrial enlargement.  4. Normal left ventricular internal dimensions and wall  thickness.  5. Normal left ventricular systolic function. EF 66%.  6. Normal right atrium.  7. Normal right ventricular size andsystolic function.  8. Mild-moderate mitral regurgitation.    < end of copied text >    < from: 12 Lead ECG (09.28.16 @ 05:34) >  Diagnosis Line NORMAL SINUS RHYTHM  LEFT ANTERIOR FASCICULARBLOCK  CANNOT RULE OUT INFERIOR INFARCT (MASKED BY FASCICULAR BLOCK?) , AGE UNDETERMINED  POSSIBLE ANTERIOR INFARCT , AGE UNDETERMINED    < end of copied text >        Assessment and plan  ---------------------------  gi medicine appreciated  continue meds  echo  awaiting egd result  gentle hydration  dvt prophylaxis CARDIOLOGY     PROGRESS  NOTE   ________________________________________________    CHIEF COMPLAINT:Patient is a 89y old  Female who presents with a chief complaint of le edema/failure to thrive (15 Feb 2019 07:55)  no complain.  	  REVIEW OF SYSTEMS:  CONSTITUTIONAL: No fever, weight loss, or fatigue  EYES: No eye pain, visual disturbances, or discharge  ENT:  No difficulty hearing, tinnitus, vertigo; No sinus or throat pain  NECK: No pain or stiffness  RESPIRATORY: No cough, wheezing, chills or hemoptysis; No Shortness of Breath  CARDIOVASCULAR: No chest pain, palpitations, passing out, dizziness, or leg swelling  GASTROINTESTINAL: No abdominal or epigastric pain. No nausea, vomiting, or hematemesis; No diarrhea or constipation. No melena or hematochezia.  GENITOURINARY: No dysuria, frequency, hematuria, or incontinence  NEUROLOGICAL: No headaches, memory loss, loss of strength, numbness, or tremors  SKIN: No itching, burning, rashes, or lesions   LYMPH Nodes: No enlarged glands  ENDOCRINE: No heat or cold intolerance; No hair loss  MUSCULOSKELETAL: No joint pain or swelling; No muscle, back, or extremity pain  PSYCHIATRIC: No depression, anxiety, mood swings, or difficulty sleeping  HEME/LYMPH: No easy bruising, or bleeding gums  ALLERGY AND IMMUNOLOGIC: No hives or eczema	    [ ] All others negative	  [ ] Unable to obtain    PHYSICAL EXAM:  T(C): 36.2 (02-15-19 @ 04:58), Max: 36.4 (02-14-19 @ 08:25)  HR: 76 (02-15-19 @ 04:58) (66 - 81)  BP: 128/77 (02-15-19 @ 04:58) (106/60 - 128/77)  RR: 18 (02-15-19 @ 04:58) (18 - 18)  SpO2: 97% (02-15-19 @ 04:58) (97% - 97%)  Wt(kg): --  I&O's Summary    14 Feb 2019 07:01  -  15 Feb 2019 07:00  --------------------------------------------------------  IN: 480 mL / OUT: 250 mL / NET: 230 mL        Appearance: Normal	  HEENT:   Normal oral mucosa, PERRL, EOMI	  Lymphatic: No lymphadenopathy  Cardiovascular: Normal S1 S2, No JVD, No murmurs, No edema  Respiratory: Lungs clear to auscultation	  Psychiatry: A & O x 3, Mood & affect appropriate  Gastrointestinal:  Soft, Non-tender, + BS	  Skin: No rashes, No ecchymoses, No cyanosis	  Neurologic: Non-focal  Extremities: Normal range of motion, No clubbing, cyanosis or edema  Vascular: Peripheral pulses palpable 2+ bilaterally    MEDICATIONS  (STANDING):  heparin  Injectable 5000 Unit(s) SubCutaneous every 12 hours  levothyroxine 50 MICROGram(s) Oral daily  nystatin    Suspension 551969 Unit(s) Oral four times a day  pantoprazole Infusion 8 mG/Hr (10 mL/Hr) IV Continuous <Continuous>  sucralfate suspension 1 Gram(s) Oral four times a day      TELEMETRY: 	    ECG:  	  RADIOLOGY:  OTHER: 	  	  LABS:	 	    CARDIAC MARKERS:                                11.7   5.0   )-----------( 165      ( 14 Feb 2019 12:17 )             34.4     02-15    143  |  106  |  24<H>  ----------------------------<  72  3.8   |  22  |  0.81    Ca    8.7      15 Feb 2019 06:52  Phos  2.9     02-14  Mg     1.8     02-14    TPro  6.0  /  Alb  2.9<L>  /  TBili  0.5  /  DBili  x   /  AST  35  /  ALT  25  /  AlkPhos  268<H>  02-15    proBNP: Serum Pro-Brain Natriuretic Peptide: 2551 pg/mL (02-14 @ 03:31)    Lipid Profile:   HgA1c:   TSH: Thyroid Stimulating Hormone, Serum: 5.70 uIU/mL (02-14 @ 05:28)  < from: Transthoracic Echocardiogram w/ Doppler (01.06.09 @ 16:44) >  1. Mitral annular calcification. Mitral valve prolapse  involving the posterior mitral leaflet.  2. Calcified trileaflet aortic valve with normal opening.  3. Severe left atrial enlargement.  4. Normal left ventricular internal dimensions and wall  thickness.  5. Normal left ventricular systolic function. EF 66%.  6. Normal right atrium.  7. Normal right ventricular size andsystolic function.  8. Mild-moderate mitral regurgitation.    < end of copied text >    < from: 12 Lead ECG (09.28.16 @ 05:34) >  Diagnosis Line NORMAL SINUS RHYTHM  LEFT ANTERIOR FASCICULARBLOCK  CANNOT RULE OUT INFERIOR INFARCT (MASKED BY FASCICULAR BLOCK?) , AGE UNDETERMINED  POSSIBLE ANTERIOR INFARCT , AGE UNDETERMINED    < end of copied text >  < from: CT Abdomen and Pelvis w/ IV Cont (02.14.19 @ 15:01) >  Small bilateral pleural effusions.    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    Status post partial gastrectomy.      < end of copied text >        Assessment and plan  ---------------------------  gi medicine appreciated  continue meds  echo  awaiting egd result  gentle hydration  dvt prophylaxis  mitral regurgitation/pleural effusion observe

## 2019-02-15 NOTE — PROGRESS NOTE ADULT - SUBJECTIVE AND OBJECTIVE BOX
Patient is a 89y old  Female who presents with a chief complaint of le edema/failure to thrive (15 Feb 2019 08:08)                                                               INTERVAL HPI/OVERNIGHT EVENTS:    REVIEW OF SYSTEMS:     CONSTITUTIONAL: No weakness, fevers or chills  RESPIRATORY: No cough, wheezing,  No shortness of breath  CARDIOVASCULAR: No chest pain or palpitations  GASTROINTESTINAL: No abdominal pain  . No nausea, vomiting, or hematemesis; No diarrhea or constipation. No melena or hematochezia.  GENITOURINARY: No dysuria, frequency or hematuria  NEUROLOGICAL: No numbness or weakness                                                                                                                                                                                                                                                                                 Medications:  MEDICATIONS  (STANDING):  heparin  Injectable 5000 Unit(s) SubCutaneous every 12 hours  levothyroxine 50 MICROGram(s) Oral daily  nystatin    Suspension 811805 Unit(s) Oral four times a day  pantoprazole Infusion 8 mG/Hr (10 mL/Hr) IV Continuous <Continuous>  sodium chloride 0.9%. 1000 milliLiter(s) (50 mL/Hr) IV Continuous <Continuous>  sucralfate suspension 1 Gram(s) Oral four times a day    MEDICATIONS  (PRN):  senna 2 Tablet(s) Oral at bedtime PRN Constipation       Allergies    No Known Drug Allergies  shellfish (Anaphylaxis)    Intolerances      Vital Signs Last 24 Hrs  T(C): 36.4 (15 Feb 2019 14:15), Max: 36.4 (15 Feb 2019 10:08)  T(F): 97.5 (15 Feb 2019 14:15), Max: 97.6 (15 Feb 2019 10:08)  HR: 83 (15 Feb 2019 14:15) (66 - 83)  BP: 124/68 (15 Feb 2019 14:15) (120/76 - 167/99)  BP(mean): --  RR: 18 (15 Feb 2019 14:15) (18 - 18)  SpO2: 96% (15 Feb 2019 14:15) (96% - 97%)  CAPILLARY BLOOD GLUCOSE      POCT Blood Glucose.: 79 mg/dL (15 Feb 2019 09:36)      02-14 @ 07:01  -  02-15 @ 07:00  --------------------------------------------------------  IN: 480 mL / OUT: 250 mL / NET: 230 mL      Physical Exam:    Daily Height in cm: 147.32 (14 Feb 2019 18:41)    General:  NAD , chachectic   HEENT:  Nonicteric, PERRLA  CV:  RRR, S1S2   Lungs:  CTA   Abdomen:  Soft, non-tender, no distended, positive BS  Extremities:  edema    Neuro:  AAOx3, non-focal, grossly intact                                                                                                                                                                                                                                                                                                LABS:                               11.7   5.0   )-----------( 165      ( 14 Feb 2019 12:17 )             34.4                      02-15    143  |  106  |  24<H>  ----------------------------<  72  3.8   |  22  |  0.81    Ca    8.7      15 Feb 2019 06:52  Phos  2.9     02-14  Mg     1.8     02-14    TPro  6.0  /  Alb  2.9<L>  /  TBili  0.5  /  DBili  x   /  AST  35  /  ALT  25  /  AlkPhos  268<H>  02-15

## 2019-02-15 NOTE — DISCHARGE NOTE ADULT - MEDICATION SUMMARY - MEDICATIONS TO TAKE
I will START or STAY ON the medications listed below when I get home from the hospital:    heparin  -- 5000 unit(s) subcutaneous 2 times a day  -- Indication: For Dvt prophylaxis    Diflucan 100 mg oral tablet  -- 1 tab(s) by mouth once a day through 3/8/19  -- Indication: For candidal esophagitis    ipratropium-albuterol 0.5 mg-2.5 mg/3 mLinhalation solution  -- 3 milliliter(s) inhaled every 6 hours  -- Indication: For bronchodilator    senna oral tablet  -- 2 tab(s) by mouth once a day (at bedtime), As needed, Constipation  -- Indication: For laxative    sucralfate 1 g/10 mL oral suspension  -- 10 milliliter(s) by mouth 4 times a day  -- Indication: For for stomach    pantoprazole 40 mg oral delayed release tablet  -- 1 tab(s) by mouth once a day (before a meal)  -- Indication: For for stomach    Synthroid 50 mcg (0.05 mg) oral tablet  -- 1 tab(s) by mouth once a day  -- Indication: For hypothyrod I will START or STAY ON the medications listed below when I get home from the hospital:    heparin  -- 5000 unit(s) subcutaneous 2 times a day  -- Indication: For Dvt prophylaxis    Diflucan 100 mg oral tablet  -- 1 tab(s) by mouth once a day through 3/8/19  -- Indication: For candidal esophagitis    ipratropium-albuterol 0.5 mg-2.5 mg/3 mLinhalation solution  -- 3 milliliter(s) inhaled every 6 hours  -- Indication: For bronchodilator    senna oral tablet  -- 2 tab(s) by mouth once a day (at bedtime), As needed, Constipation  -- Indication: For laxative    sucralfate 1 g/10 mL oral suspension  -- 10 milliliter(s) by mouth 4 times a day  -- Indication: For for stomach    pantoprazole 40 mg oral delayed release tablet  -- 1 tab(s) by mouth once a day (before a meal)  -- Indication: For for stomach    Synthroid 50 mcg (0.05 mg) oral tablet  -- 1 tab(s) by mouth once a day  -- Indication: For hypothyrod    folic acid 1 mg oral tablet  -- 1 tab(s) by mouth once a day  -- Indication: For vitamin supplement

## 2019-02-15 NOTE — DIETITIAN INITIAL EVALUATION ADULT. - NS FNS WEIGHT CHANGE REASON
Noted family reported >10 pounds lost in the last month as per documentation. Weight as per flow sheets (02/14) 93 pounds -?accuracy due to fluid shifts.

## 2019-02-15 NOTE — DIETITIAN INITIAL EVALUATION ADULT. - ORAL INTAKE PTA
As per documentation: pt's family reported pt with decreased PO intake x 2 weeks PTA, with difficulty swallowing and more than 10 pounds weight loss x 1 month. Noted pt with allergy to shellfish as per chart.

## 2019-02-15 NOTE — DISCHARGE NOTE ADULT - PLAN OF CARE
maintain stable hemoglobin monitor fro any bleeding  monitor cbc weekly continue dysphagia diet with aspiration precaution  follow up by speech/swallow eval monitor for any bleeding  monitor cbc weekly continue dysphagia diet with aspiration precaution as per pt/family wish  follow up by speech/swallow eval at rehab mendez cath placed on 2/22/19-voiding trial when more mobile/ambulatory

## 2019-02-15 NOTE — DIETITIAN INITIAL EVALUATION ADULT. - NS AS NUTRI INTERV MEALS SNACK
Recommend continue current diet. Defer diet/fluid consistencies to medical team/SLP recommendations. Encourage PO intake, obtain food preferences, provide feeding assistance as needed.

## 2019-02-15 NOTE — PROGRESS NOTE ADULT - ASSESSMENT
89 y/o female hx of gastric cancer and hypothyroid admitted with dysphagia/  acute GI bleed and weight loss     1- GI Bleed : monitor H/H   PPI IV   avoid NSAIDS   transfuse PRN     2- dysphagia : EGD: < from: Upper Endoscopy (02.14.19 @ 16:27) >     -very tortuous ues, ? exterinsic compression       -? monilial candidiasis       -ulcer at anastamosis, s/p gastric resection                                                                                                        Impression:          - rx candida, will get esopahgram. carafate  Recommendation:      - Await pathology results.    cont nystatin          3- hypothyroid: cont meds   acceptable range     4- Anemia : monitor for now     5- edema : likely thrid spacing however consdier VA duplex r/o DVT     PAS

## 2019-02-15 NOTE — DISCHARGE NOTE ADULT - MEDICATION SUMMARY - MEDICATIONS TO CHANGE
I will SWITCH the dose or number of times a day I take the medications listed below when I get home from the hospital:    sucralfate 1 g oral tablet  -- 1 tab(s) by mouth 4 times a day

## 2019-02-15 NOTE — DISCHARGE NOTE ADULT - HOSPITAL COURSE
89 y/o female hx of gastric cancer and hypothyroid admitted with dysphagia/  acute GI bleed and weight loss .  seen by GI  had EGD 2/14/19 89 y/o female hx of gastric cancer and hypothyroid admitted with dysphagia/  acute GI bleed and weight loss .  seen by GI  had EGD 2/14/19   GI Bleed : no further episodes    H/H stable   avoid NSAIDS    dysphagia : EGD: < from: Upper Endoscopy (02.14.19 @ 16:27) >     -very tortuous ues, ? exterinsic compression       -? monilial candidiasis       -ulcer at anastamosis, s/p gastric resection         cont antifungal   esophagram - risk of aspiration-per patient family wish dysphagia diet   Encephalopathy :   unclear etiology   elevated CK possible sec to haldol ..now CK improving with IVF    CT head negative    Ct chest : negatvie for pna   psych input apprecaited   avoid haldol   palliative pt was seen and examined on    hospice evaluation        Would avoid anticholinergic agents such as Benztropine as it will worsen her confusion and tachycardia 89 y/o female hx of gastric cancer and hypothyroid admitted with dysphagia/  acute GI bleed and weight loss .  seen by GI  had EGD 2/14/19   GI Bleed : no further episodes    H/H stable   avoid NSAIDS    dysphagia : EGD: < from: Upper Endoscopy (02.14.19 @ 16:27) >     -very tortuous ues, ? exterinsic compression       -? monilial candidiasis       -ulcer at anastamosis, s/p gastric resection        biopsy showed candidal esophagitis  cont antifungal with diflucan through 3/81/9  esophagram - risk of aspiration-per patient family wish dysphagia diet   Encephalopathy :   unclear etiology   elevated CK possible sec to haldol ..now CK improving with IVF    CT head negative    Ct chest : negatvie for pna   psych input apprecaited   avoid haldol /neuroleptics  had hospice evaluation;  pt family for rehab to long term care option;  has MOLST/DNR  mendez cath for urinary retention voiding trial when more mobile/ambulatory	  Would avoid anticholinergic agents such as Benztropine as it will worsen her confusion and tachycardia 89 y/o female hx of gastric cancer and hypothyroid admitted with dysphagia/  acute GI bleed and weight loss .  seen by GI  had EGD 2/14/19   GI Bleed : no further episodes    H/H stable   avoid NSAIDS    dysphagia : EGD: < from: Upper Endoscopy (02.14.19 @ 16:27) >     -very tortuous ues, ? exterinsic compression       -? monilial candidiasis       -ulcer at anastamosis, s/p gastric resection        biopsy showed candidal esophagitis  cont antifungal with diflucan through 3/81/9  esophagram - risk of aspiration-per patient family wish dysphagia diet   Encephalopathy :   unclear etiology   elevated CK possible sec to haldol ..now CK improving with IVF    CT head negative    Ct chest : negatvie for pna   psych input apprecaited   avoid haldol /neuroleptics  had hospice evaluation;  pt family for long term care option;  has MOLST/DNR  mendez cath for urinary retention voiding trial when more mobile/ambulatory	  Would avoid anticholinergic agents such as Benztropine as it will worsen her confusion and tachycardia  follow up with psychiatrist at the facility

## 2019-02-15 NOTE — DISCHARGE NOTE ADULT - CARE PLAN
Principal Discharge DX:	Gastrointestinal hemorrhage, unspecified gastrointestinal hemorrhage type  Secondary Diagnosis:	Dysphasia Principal Discharge DX:	Gastrointestinal hemorrhage, unspecified gastrointestinal hemorrhage type  Goal:	maintain stable hemoglobin  Assessment and plan of treatment:	monitor fro any bleeding  monitor cbc weekly  Secondary Diagnosis:	Dysphagia  Assessment and plan of treatment:	continue dysphagia diet with aspiration precaution  follow up by speech/swallow eval Principal Discharge DX:	Gastrointestinal hemorrhage, unspecified gastrointestinal hemorrhage type  Goal:	maintain stable hemoglobin  Assessment and plan of treatment:	monitor for any bleeding  monitor cbc weekly  Secondary Diagnosis:	Dysphagia  Assessment and plan of treatment:	continue dysphagia diet with aspiration precaution as per pt/family wish  follow up by speech/swallow eval at rehab  Secondary Diagnosis:	Urinary retention  Assessment and plan of treatment:	mendez cath placed on 2/22/19-voiding trial when more mobile/ambulatory

## 2019-02-15 NOTE — DIETITIAN INITIAL EVALUATION ADULT. - NS AS NUTRI INTERV COLLABORAT
Malnutrition notification placed in chart - discussed diet, supplements, vitamins, and malnutrition notification with provider. Obtain current weight to identify changes if any. RD remains available.

## 2019-02-15 NOTE — DISCHARGE NOTE ADULT - PATIENT PORTAL LINK FT
You can access the [facility] Patient Portal, offered by , by registering with the following website: [email or web address]

## 2019-02-15 NOTE — DIETITIAN INITIAL EVALUATION ADULT. - ENERGY NEEDS
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    Noted +2 bilateral ankle and foot edema as per flow sheets. Skin: pressure ulcer in sacral spine stage 2 as per documentation.

## 2019-02-15 NOTE — DISCHARGE NOTE ADULT - CARE PROVIDER_API CALL
Arley Resendiz (DO)  Gastroenterology; Internal Medicine  [address] [facility] [address]  [place] [address]  Phone: [phone]  Fax: [phone]  Follow Up Time:     Gabrielle Green (DO)  Cardiology Medicine  [address] [place] [address], [facility] [address]  [place] [address]  Phone: [phone]  Fax: [phone]  Follow Up Time:

## 2019-02-15 NOTE — DIETITIAN INITIAL EVALUATION ADULT. - PHYSICAL APPEARANCE
Unable to obtain pt's consent to perform Nutrition Focused Physical Exam due to AMS, however noted visual signs of severe muscle loss in temporales, clavicles, and shoulders and severe fat loss in triceps and ribs./emaciated

## 2019-02-16 LAB
ANION GAP SERPL CALC-SCNC: 18 MMOL/L — HIGH (ref 5–17)
APPEARANCE UR: CLEAR — SIGNIFICANT CHANGE UP
BILIRUB UR-MCNC: NEGATIVE — SIGNIFICANT CHANGE UP
BUN SERPL-MCNC: 25 MG/DL — HIGH (ref 7–23)
CALCIUM SERPL-MCNC: 9.5 MG/DL — SIGNIFICANT CHANGE UP (ref 8.4–10.5)
CHLORIDE SERPL-SCNC: 104 MMOL/L — SIGNIFICANT CHANGE UP (ref 96–108)
CO2 SERPL-SCNC: 21 MMOL/L — LOW (ref 22–31)
COLOR SPEC: YELLOW — SIGNIFICANT CHANGE UP
CREAT SERPL-MCNC: 0.88 MG/DL — SIGNIFICANT CHANGE UP (ref 0.5–1.3)
DIFF PNL FLD: ABNORMAL
GLUCOSE SERPL-MCNC: 85 MG/DL — SIGNIFICANT CHANGE UP (ref 70–99)
GLUCOSE UR QL: NEGATIVE — SIGNIFICANT CHANGE UP
HCT VFR BLD CALC: 39.7 % — SIGNIFICANT CHANGE UP (ref 34.5–45)
HGB BLD-MCNC: 12.8 G/DL — SIGNIFICANT CHANGE UP (ref 11.5–15.5)
KETONES UR-MCNC: SIGNIFICANT CHANGE UP
LEUKOCYTE ESTERASE UR-ACNC: NEGATIVE — SIGNIFICANT CHANGE UP
MCHC RBC-ENTMCNC: 31.1 PG — SIGNIFICANT CHANGE UP (ref 27–34)
MCHC RBC-ENTMCNC: 32.2 GM/DL — SIGNIFICANT CHANGE UP (ref 32–36)
MCV RBC AUTO: 96.4 FL — SIGNIFICANT CHANGE UP (ref 80–100)
NITRITE UR-MCNC: NEGATIVE — SIGNIFICANT CHANGE UP
PH UR: 5.5 — SIGNIFICANT CHANGE UP (ref 5–8)
PLATELET # BLD AUTO: 230 K/UL — SIGNIFICANT CHANGE UP (ref 150–400)
POTASSIUM SERPL-MCNC: 3.9 MMOL/L — SIGNIFICANT CHANGE UP (ref 3.5–5.3)
POTASSIUM SERPL-SCNC: 3.9 MMOL/L — SIGNIFICANT CHANGE UP (ref 3.5–5.3)
PROT UR-MCNC: ABNORMAL
RBC # BLD: 4.12 M/UL — SIGNIFICANT CHANGE UP (ref 3.8–5.2)
RBC # FLD: 14.5 % — SIGNIFICANT CHANGE UP (ref 10.3–14.5)
SODIUM SERPL-SCNC: 143 MMOL/L — SIGNIFICANT CHANGE UP (ref 135–145)
SP GR SPEC: 1.05 — HIGH (ref 1.01–1.02)
UROBILINOGEN FLD QL: NEGATIVE — SIGNIFICANT CHANGE UP
WBC # BLD: 7.66 K/UL — SIGNIFICANT CHANGE UP (ref 3.8–10.5)
WBC # FLD AUTO: 7.66 K/UL — SIGNIFICANT CHANGE UP (ref 3.8–10.5)

## 2019-02-16 RX ORDER — HALOPERIDOL DECANOATE 100 MG/ML
0.5 INJECTION INTRAMUSCULAR ONCE
Qty: 0 | Refills: 0 | Status: COMPLETED | OUTPATIENT
Start: 2019-02-16 | End: 2019-02-16

## 2019-02-16 RX ADMIN — Medication 500000 UNIT(S): at 19:01

## 2019-02-16 RX ADMIN — HALOPERIDOL DECANOATE 0.5 MILLIGRAM(S): 100 INJECTION INTRAMUSCULAR at 23:45

## 2019-02-16 RX ADMIN — HEPARIN SODIUM 5000 UNIT(S): 5000 INJECTION INTRAVENOUS; SUBCUTANEOUS at 19:01

## 2019-02-16 RX ADMIN — Medication 1 GRAM(S): at 05:13

## 2019-02-16 RX ADMIN — Medication 500000 UNIT(S): at 23:49

## 2019-02-16 RX ADMIN — HEPARIN SODIUM 5000 UNIT(S): 5000 INJECTION INTRAVENOUS; SUBCUTANEOUS at 05:12

## 2019-02-16 RX ADMIN — Medication 1 GRAM(S): at 11:36

## 2019-02-16 RX ADMIN — SODIUM CHLORIDE 50 MILLILITER(S): 9 INJECTION INTRAMUSCULAR; INTRAVENOUS; SUBCUTANEOUS at 05:10

## 2019-02-16 RX ADMIN — Medication 1 GRAM(S): at 19:01

## 2019-02-16 RX ADMIN — Medication 500000 UNIT(S): at 11:36

## 2019-02-16 RX ADMIN — Medication 50 MICROGRAM(S): at 05:12

## 2019-02-16 RX ADMIN — Medication 1 GRAM(S): at 23:49

## 2019-02-16 RX ADMIN — Medication 500000 UNIT(S): at 05:12

## 2019-02-16 NOTE — CHART NOTE - NSCHARTNOTEFT_GEN_A_CORE
Called by RN, pt repeatedly trying to get out of bed, she was later on placed in the hallway with chair alarm for fall precaution and continuous monitoring by staff. Pt still attempts to get out the chair. Pt screaming out help in the hallway presently, reoriented and provided distraction. Informed by RN, pt was up last night and slept during the day, her room was changed today for closer supervision by the nursing station.  RN requesting sleep aid to calm the patient for now.  Pt is a pleasant, confused 89 y/o female hx of gastric cancer and hypothyroid admitted with dysphagia/  acute GI bleed and weight loss. H/H stable on daily CBC. Pt risk for fall informed RN, will not offer sleep aid, benzos or anticholingeric as in Benadryl due to risk of worsening confusion.  Will attempt Haldol 0.5mg IVP x 1 for now  for present fall risk  continue with fall precaution and bed alarm.  EKG checked QTC within normal limits.     Steven Andre NP  [id number]

## 2019-02-16 NOTE — PROGRESS NOTE ADULT - SUBJECTIVE AND OBJECTIVE BOX
CARDIOLOGY     PROGRESS  NOTE   ________________________________________________    CHIEF COMPLAINT:Patient is a 89y old  Female who presents with a chief complaint of le edema/failure to thrive (15 Feb 2019 16:44)  no complain.  	  REVIEW OF SYSTEMS:  CONSTITUTIONAL: No fever,+ weight loss,  fatigue  EYES: No eye pain, visual disturbances, or discharge  ENT:  No difficulty hearing, tinnitus, vertigo; No sinus or throat pain  NECK: No pain or stiffness  RESPIRATORY: No cough, wheezing, chills or hemoptysis; No Shortness of Breath  CARDIOVASCULAR: No chest pain, palpitations, passing out, dizziness, or leg swelling  GASTROINTESTINAL: No abdominal or epigastric pain. No nausea, vomiting, or hematemesis; No diarrhea or constipation. No melena or hematochezia.  GENITOURINARY: No dysuria, frequency, hematuria, or incontinence  NEUROLOGICAL: No headaches, memory loss, loss of strength, numbness, or tremors  SKIN: No itching, burning, rashes, or lesions   LYMPH Nodes: No enlarged glands  ENDOCRINE: No heat or cold intolerance; No hair loss  MUSCULOSKELETAL: No joint pain or swelling; No muscle, back, or extremity pain  PSYCHIATRIC: No depression, anxiety, mood swings, or difficulty sleeping  HEME/LYMPH: No easy bruising, or bleeding gums  ALLERGY AND IMMUNOLOGIC: No hives or eczema	    [ ] All others negative	  [ ] Unable to obtain    PHYSICAL EXAM:  T(C): 36.3 (02-16-19 @ 04:16), Max: 36.4 (02-15-19 @ 10:08)  HR: 69 (02-16-19 @ 04:16) (69 - 83)  BP: 129/73 (02-16-19 @ 04:16) (124/68 - 167/99)  RR: 18 (02-16-19 @ 04:16) (18 - 18)  SpO2: 96% (02-16-19 @ 04:16) (96% - 97%)  Wt(kg): --  I&O's Summary    15 Feb 2019 07:01  -  16 Feb 2019 07:00  --------------------------------------------------------  IN: 1200 mL / OUT: 500 mL / NET: 700 mL        Appearance: cachectic  HEENT:   Normal oral mucosa, PERRL, EOMI	  Lymphatic: No lymphadenopathy  Cardiovascular: Normal S1 S2, No JVD, + murmurs, No edema  Respiratory: Lungs clear to auscultation	  Psychiatry: A & O x 3, Mood & affect appropriate  Gastrointestinal:  Soft, Non-tender, + BS	  Skin: No rashes, No ecchymoses, No cyanosis	  Neurologic: Non-focal  Extremities: Normal range of motion, No clubbing, cyanosis or edema  Vascular: Peripheral pulses palpable 2+ bilaterally    MEDICATIONS  (STANDING):  heparin  Injectable 5000 Unit(s) SubCutaneous every 12 hours  levothyroxine 50 MICROGram(s) Oral daily  nystatin    Suspension 605367 Unit(s) Oral four times a day  pantoprazole Infusion 8 mG/Hr (10 mL/Hr) IV Continuous <Continuous>  sodium chloride 0.9%. 1000 milliLiter(s) (50 mL/Hr) IV Continuous <Continuous>  sucralfate suspension 1 Gram(s) Oral four times a day      TELEMETRY: 	    ECG:  	  RADIOLOGY:  OTHER: 	  	  LABS:	 	    CARDIAC MARKERS:                                12.8   6.7   )-----------( 211      ( 15 Feb 2019 15:01 )             40.1     02-15    143  |  106  |  24<H>  ----------------------------<  72  3.8   |  22  |  0.81    Ca    8.7      15 Feb 2019 06:52    TPro  6.0  /  Alb  2.9<L>  /  TBili  0.5  /  DBili  x   /  AST  35  /  ALT  25  /  AlkPhos  268<H>  02-15    proBNP: Serum Pro-Brain Natriuretic Peptide: 2551 pg/mL (02-14 @ 03:31)    Lipid Profile:   HgA1c:   TSH: Thyroid Stimulating Hormone, Serum: 5.70 uIU/mL (02-14 @ 05:28)  Surgical Pathology Report (02.14.19 @ 16:43)    Surgical Pathology Report:   ACCESSION No:  [id number]    PRISCILLA RICHARDSON        Surgical Final Report          Final Diagnosis  1. GE junction, biopsy:  - Gastric mucosa with no significant histopathologic  findings.    2. Esophagus, biopsy:  - Squamous mucosa with chronic inflammation and rare  intraepithelial eosinophil, see note.    Note: The findigs suggest possibilities of lymphocytic  esophagitis pattern and/or gastroesophageal reflux. No fungal  organisms identified on HE stain. GMS stain is in progress and  will be reported as addendum.    Verified by: Lazaro Hamm M.D.  (Electronic Signature)  Reported on: 02/15/19 13:48 EST, [address] [place] [address]  _________________________________________________________________    Clinical History  Dysphagia. Rule out ?Monillial    Specimen(s) Submitted  1     GE Junction biopsy  2     Esophageal bx    Gross Description  1.   The specimen is received in formalin and the specimen  container is labeled: GE junction.  It consists of one, soft,  tan, fragment of tissue, measuring up to 0.2 cm in greatest  dimension.  Entirely submitted.  One cassette.    2.   The specimen is received in formalin and the specimen  container is labeled: Esophageal.  It consists of one, soft, tan,  fragment of tissue measuring up to 0.3 cm in greatest dimension.  Entirely submitted.  One cassette.    In addition to other data that may appear on the specimen  containers, all labels have been inspected to confirm the  presence of the patient's name and date of birth.        < from: CT Chest w/ IV Cont (02.14.19 @ 10:40) >  Small bilateral pleural effusions.    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    Status post partial gastrectomy.      < from: CT Abdomen and Pelvis w/ IV Cont (02.14.19 @ 15:01) >  BOWEL: Status post partial gastrectomy. No bowel obstruction.   Nonvisualized appendix.   PERITONEUM: No ascites.  VESSELS:  Atherosclerotic vascular calcifications.  RETROPERITONEUM: No lymphadenopathy.    ABDOMINAL WALL: Within normal limits.  BONES: Multilevel spinal degenerative changes.    < end of copied text >

## 2019-02-16 NOTE — PROGRESS NOTE ADULT - ASSESSMENT
conservative management.  appreciate cardiology, not a good candidate for peg secondary to gastric surgery in past.

## 2019-02-16 NOTE — PROGRESS NOTE ADULT - ASSESSMENT
events noted  check bladder scan   non sign of extrinsic  mass on chest ct  GI follow up  ?palliative care  pt is not a candidate for any aggressive cardiac work up  ???peg

## 2019-02-16 NOTE — PROGRESS NOTE ADULT - SUBJECTIVE AND OBJECTIVE BOX
Subjective: Patient seen and examined. No new events except as noted.     REVIEW OF SYSTEMS:    CONSTITUTIONAL: No weakness, fevers or chills  EYES/ENT: No visual changes;  No vertigo or throat pain   NECK: No pain or stiffness  RESPIRATORY: No cough, wheezing, hemoptysis; No shortness of breath  CARDIOVASCULAR: No chest pain or palpitations  GASTROINTESTINAL: No abdominal or epigastric pain. No nausea, vomiting, or hematemesis; No diarrhea or constipation. No melena or hematochezia.  GENITOURINARY: No dysuria, frequency or hematuria  NEUROLOGICAL: No numbness or weakness  SKIN: No itching, burning, rashes, or lesions   All other review of systems is negative unless indicated above.    MEDICATIONS:  MEDICATIONS  (STANDING):  heparin  Injectable 5000 Unit(s) SubCutaneous every 12 hours  levothyroxine 50 MICROGram(s) Oral daily  nystatin    Suspension 073896 Unit(s) Oral four times a day  pantoprazole Infusion 8 mG/Hr (10 mL/Hr) IV Continuous <Continuous>  sodium chloride 0.9%. 1000 milliLiter(s) (50 mL/Hr) IV Continuous <Continuous>  sucralfate suspension 1 Gram(s) Oral four times a day      PHYSICAL EXAM:  T(C): 36.4 (19 @ 19:08), Max: 36.4 (19 @ 14:16)  HR: 72 (19 @ 19:08) (66 - 72)  BP: 110/74 (19 @ 19:08) (102/69 - 129/73)  RR: 18 (19 @ 19:08) (18 - 18)  SpO2: 98% (19 @ 19:08) (96% - 99%)  Wt(kg): --  I&O's Summary    15 Feb 2019 07:01  -  2019 07:00  --------------------------------------------------------  IN: 1200 mL / OUT: 500 mL / NET: 700 mL    2019 07:  -  2019 00:27  --------------------------------------------------------  IN: 1210 mL / OUT: 0 mL / NET: 1210 mL          Appearance: cachectic 	  HEENT:   Normal oral mucosa, PERRL, EOMI	  Lymphatic: No lymphadenopathy , no edema  Cardiovascular: Normal S1 S2, No JVD, No murmurs , Peripheral pulses palpable 2+ bilaterally  Respiratory: Lungs clear to auscultation, normal effort 	  Gastrointestinal:  Soft, Non-tender, + BS	  Skin: No rashes, No ecchymoses, No cyanosis, warm to touch  Musculoskeletal: LE weakness  Psychiatry:  nonverbal  Ext: No edema      All labs, Imaging and EKGs personally reviewed                           12.8   7.66  )-----------( 230      ( 2019 09:04 )             39.7               02-16    143  |  104  |  25<H>  ----------------------------<  85  3.9   |  21<L>  |  0.88    Ca    9.5      2019 07:35    TPro  6.0  /  Alb  2.9<L>  /  TBili  0.5  /  DBili  x   /  AST  35  /  ALT  25  /  AlkPhos  268<H>  02-15                       Urinalysis Basic - ( 2019 10:35 )    Color: Yellow / Appearance: Clear / S.048 / pH: x  Gluc: x / Ketone: Trace  / Bili: Negative / Urobili: Negative   Blood: x / Protein: Trace / Nitrite: Negative   Leuk Esterase: Negative / RBC: 4 /hpf / WBC 2 /HPF   Sq Epi: x / Non Sq Epi: 1 /hpf / Bacteria: Moderate

## 2019-02-16 NOTE — PROGRESS NOTE ADULT - SUBJECTIVE AND OBJECTIVE BOX
PRISCILLA RICHARDSON:[id number],   89yFemale followed for:  No Known Drug Allergies  shellfish (Anaphylaxis)    PAST MEDICAL & SURGICAL HISTORY:  Stomach cancer: 2003  Kidney stone  Hypothyroid  History of ovarian cyst: 2009    FAMILY HISTORY:    MEDICATIONS  (STANDING):  heparin  Injectable 5000 Unit(s) SubCutaneous every 12 hours  levothyroxine 50 MICROGram(s) Oral daily  nystatin    Suspension 418602 Unit(s) Oral four times a day  pantoprazole Infusion 8 mG/Hr (10 mL/Hr) IV Continuous <Continuous>  sodium chloride 0.9%. 1000 milliLiter(s) (50 mL/Hr) IV Continuous <Continuous>  sucralfate suspension 1 Gram(s) Oral four times a day    MEDICATIONS  (PRN):  senna 2 Tablet(s) Oral at bedtime PRN Constipation      Vital Signs Last 24 Hrs  T(C): 36.3 (16 Feb 2019 04:16), Max: 36.4 (15 Feb 2019 14:15)  T(F): 97.4 (16 Feb 2019 04:16), Max: 97.5 (15 Feb 2019 14:15)  HR: 69 (16 Feb 2019 04:16) (69 - 83)  BP: 129/73 (16 Feb 2019 04:16) (124/68 - 130/70)  BP(mean): --  RR: 18 (16 Feb 2019 04:16) (18 - 18)  SpO2: 96% (16 Feb 2019 04:16) (96% - 96%)  nc/at  s1s2  cta  soft, nt, nd no guarding or rebound  no c/c/e    CBC Full  -  ( 16 Feb 2019 09:04 )  WBC Count : 7.66 K/uL  Hemoglobin : 12.8 g/dL  Hematocrit : 39.7 %  Platelet Count - Automated : 230 K/uL  Mean Cell Volume : 96.4 fl  Mean Cell Hemoglobin : 31.1 pg  Mean Cell Hemoglobin Concentration : 32.2 gm/dL  Auto Neutrophil # : x  Auto Lymphocyte # : x  Auto Monocyte # : x  Auto Eosinophil # : x  Auto Basophil # : x  Auto Neutrophil % : x  Auto Lymphocyte % : x  Auto Monocyte % : x  Auto Eosinophil % : x  Auto Basophil % : x    02-16    143  |  104  |  25<H>  ----------------------------<  85  3.9   |  21<L>  |  0.88    Ca    9.5      16 Feb 2019 07:35    TPro  6.0  /  Alb  2.9<L>  /  TBili  0.5  /  DBili  x   /  AST  35  /  ALT  25  /  AlkPhos  268<H>  02-15

## 2019-02-16 NOTE — CHART NOTE - NSCHARTNOTEFT_GEN_A_CORE
Notified by RN that patient hasn't voided. Bladder scan shows > 400mL. Advised to straight cath for residual volume. Per RN, patient had no issue voiding till yesterday. Will check UA and endorse to primary team in AM.    Sarah Carrillo PA-C

## 2019-02-17 LAB
ANION GAP SERPL CALC-SCNC: 13 MMOL/L — SIGNIFICANT CHANGE UP (ref 5–17)
BUN SERPL-MCNC: 24 MG/DL — HIGH (ref 7–23)
CALCIUM SERPL-MCNC: 9.1 MG/DL — SIGNIFICANT CHANGE UP (ref 8.4–10.5)
CHLORIDE SERPL-SCNC: 107 MMOL/L — SIGNIFICANT CHANGE UP (ref 96–108)
CO2 SERPL-SCNC: 25 MMOL/L — SIGNIFICANT CHANGE UP (ref 22–31)
CREAT SERPL-MCNC: 0.97 MG/DL — SIGNIFICANT CHANGE UP (ref 0.5–1.3)
GLUCOSE SERPL-MCNC: 96 MG/DL — SIGNIFICANT CHANGE UP (ref 70–99)
HCT VFR BLD CALC: 37.2 % — SIGNIFICANT CHANGE UP (ref 34.5–45)
HGB BLD-MCNC: 11.8 G/DL — SIGNIFICANT CHANGE UP (ref 11.5–15.5)
MCHC RBC-ENTMCNC: 30.9 PG — SIGNIFICANT CHANGE UP (ref 27–34)
MCHC RBC-ENTMCNC: 31.7 GM/DL — LOW (ref 32–36)
MCV RBC AUTO: 97.4 FL — SIGNIFICANT CHANGE UP (ref 80–100)
PLATELET # BLD AUTO: 249 K/UL — SIGNIFICANT CHANGE UP (ref 150–400)
POTASSIUM SERPL-MCNC: 4.1 MMOL/L — SIGNIFICANT CHANGE UP (ref 3.5–5.3)
POTASSIUM SERPL-SCNC: 4.1 MMOL/L — SIGNIFICANT CHANGE UP (ref 3.5–5.3)
RBC # BLD: 3.82 M/UL — SIGNIFICANT CHANGE UP (ref 3.8–5.2)
RBC # FLD: 14.7 % — HIGH (ref 10.3–14.5)
SODIUM SERPL-SCNC: 145 MMOL/L — SIGNIFICANT CHANGE UP (ref 135–145)
WBC # BLD: 5.46 K/UL — SIGNIFICANT CHANGE UP (ref 3.8–10.5)
WBC # FLD AUTO: 5.46 K/UL — SIGNIFICANT CHANGE UP (ref 3.8–10.5)

## 2019-02-17 PROCEDURE — 93010 ELECTROCARDIOGRAM REPORT: CPT

## 2019-02-17 RX ORDER — SODIUM CHLORIDE 9 MG/ML
1000 INJECTION, SOLUTION INTRAVENOUS
Qty: 0 | Refills: 0 | Status: DISCONTINUED | OUTPATIENT
Start: 2019-02-17 | End: 2019-02-19

## 2019-02-17 RX ORDER — HALOPERIDOL DECANOATE 100 MG/ML
1 INJECTION INTRAMUSCULAR ONCE
Qty: 0 | Refills: 0 | Status: COMPLETED | OUTPATIENT
Start: 2019-02-17 | End: 2019-02-17

## 2019-02-17 RX ADMIN — Medication 1 GRAM(S): at 14:07

## 2019-02-17 RX ADMIN — Medication 50 MICROGRAM(S): at 06:39

## 2019-02-17 RX ADMIN — SODIUM CHLORIDE 50 MILLILITER(S): 9 INJECTION, SOLUTION INTRAVENOUS at 23:12

## 2019-02-17 RX ADMIN — HALOPERIDOL DECANOATE 1 MILLIGRAM(S): 100 INJECTION INTRAMUSCULAR at 02:07

## 2019-02-17 RX ADMIN — Medication 1 GRAM(S): at 06:39

## 2019-02-17 RX ADMIN — SODIUM CHLORIDE 50 MILLILITER(S): 9 INJECTION INTRAMUSCULAR; INTRAVENOUS; SUBCUTANEOUS at 06:39

## 2019-02-17 RX ADMIN — Medication 500000 UNIT(S): at 14:07

## 2019-02-17 RX ADMIN — HEPARIN SODIUM 5000 UNIT(S): 5000 INJECTION INTRAVENOUS; SUBCUTANEOUS at 06:39

## 2019-02-17 RX ADMIN — PANTOPRAZOLE SODIUM 10 MG/HR: 20 TABLET, DELAYED RELEASE ORAL at 06:39

## 2019-02-17 RX ADMIN — Medication 500000 UNIT(S): at 06:39

## 2019-02-17 NOTE — PHYSICAL THERAPY INITIAL EVALUATION ADULT - PERTINENT HX OF CURRENT PROBLEM, REHAB EVAL
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 CXR: Small bilateral pleural effusions with adjacent atelectasis.

## 2019-02-17 NOTE — PROGRESS NOTE ADULT - SUBJECTIVE AND OBJECTIVE BOX
Patient is a 89y old  Female who presents with a chief complaint of le edema/failure to thrive (17 Feb 2019 09:49)                                                               INTERVAL HPI/OVERNIGHT EVENTS:    REVIEW OF SYSTEMS:     CONSTITUTIONAL: No weakness, fevers or chills  EYES/ENT: No visual changes , no ear ache   NECK: No pain or stiffness  RESPIRATORY: No cough, wheezing,  No shortness of breath  CARDIOVASCULAR: No chest pain or palpitations  GASTROINTESTINAL: No abdominal pain  . No nausea, vomiting, or hematemesis; No diarrhea or constipation. No melena or hematochezia.  GENITOURINARY: No dysuria, frequency or hematuria  NEUROLOGICAL: No numbness or weakness  SKIN: No itching, burning, rashes, or lesions                                                                                                                                                                                                                                                                                 Medications:  MEDICATIONS  (STANDING):  heparin  Injectable 5000 Unit(s) SubCutaneous every 12 hours  levothyroxine 50 MICROGram(s) Oral daily  nystatin    Suspension 006374 Unit(s) Oral four times a day  pantoprazole Infusion 8 mG/Hr (10 mL/Hr) IV Continuous <Continuous>  sodium chloride 0.9%. 1000 milliLiter(s) (50 mL/Hr) IV Continuous <Continuous>  sucralfate suspension 1 Gram(s) Oral four times a day    MEDICATIONS  (PRN):  senna 2 Tablet(s) Oral at bedtime PRN Constipation       Allergies    No Known Drug Allergies  shellfish (Anaphylaxis)    Intolerances      Vital Signs Last 24 Hrs  T(C): 36.3 (17 Feb 2019 12:25), Max: 36.4 (16 Feb 2019 19:08)  T(F): 97.4 (17 Feb 2019 12:25), Max: 97.6 (17 Feb 2019 02:05)  HR: 92 (17 Feb 2019 12:25) (65 - 92)  BP: 117/66 (17 Feb 2019 12:25) (110/70 - 125/79)  BP(mean): --  RR: 18 (17 Feb 2019 12:25) (18 - 18)  SpO2: 93% (17 Feb 2019 12:25) (93% - 98%)  CAPILLARY BLOOD GLUCOSE          02-16 @ 07:01  -  02-17 @ 07:00  --------------------------------------------------------  IN: 1990 mL / OUT: 0 mL / NET: 1990 mL    02-17 @ 07:01  -  02-17 @ 18:13  --------------------------------------------------------  IN: 780 mL / OUT: 0 mL / NET: 780 mL      Physical Exam:    Daily     Daily   General:  Well appearing, NAD, not cachetic  HEENT:  Nonicteric, PERRLA  CV:  RRR, S1S2   Lungs:  CTA B/L, no wheezes, rales, rhonchi  Abdomen:  Soft, non-tender, no distended, positive BS  Extremities:  2+ pulses, no c/c, no edema  Skin:  Warm and dry, no rashes  :  No mendez  Neuro:  AAOx3, non-focal, grossly intact                                                                                                                                                                                                                                                                                                LABS:                               11.8   5.46  )-----------( 249      ( 17 Feb 2019 13:45 )             37.2                      02-17    145  |  107  |  24<H>  ----------------------------<  96  4.1   |  25  |  0.97    Ca    9.1      17 Feb 2019 10:52                         RADIOLOGY & ADDITIONAL TESTS         I personally reviewed: [  ]EKG   [  ]CXR    [  ] CT      A/P:         Discussed with :     Emeka consultants' Notes   Time spent : Patient is a 89y old  Female who presents with a chief complaint of le edema/failure to thrive (17 Feb 2019 09:49)                                                               INTERVAL HPI/OVERNIGHT EVENTS:    REVIEW OF SYSTEMS:   sleeping but arousable   otherwise denies any complaints                                                                                                                                                                                                                                                                                Medications:  MEDICATIONS  (STANDING):  heparin  Injectable 5000 Unit(s) SubCutaneous every 12 hours  levothyroxine 50 MICROGram(s) Oral daily  nystatin    Suspension [id number] Unit(s) Oral four times a day  pantoprazole Infusion 8 mG/Hr (10 mL/Hr) IV Continuous <Continuous>  sodium chloride 0.9%. 1000 milliLiter(s) (50 mL/Hr) IV Continuous <Continuous>  sucralfate suspension 1 Gram(s) Oral four times a day    MEDICATIONS  (PRN):  senna 2 Tablet(s) Oral at bedtime PRN Constipation       Allergies    No Known Drug Allergies  shellfish (Anaphylaxis)    Intolerances      Vital Signs Last 24 Hrs  T(C): 36.3 (17 Feb 2019 12:25), Max: 36.4 (16 Feb 2019 19:08)  T(F): 97.4 (17 Feb 2019 12:25), Max: 97.6 (17 Feb 2019 02:05)  HR: 92 (17 Feb 2019 12:25) (65 - 92)  BP: 117/66 (17 Feb 2019 12:25) (110/70 - 125/79)  BP(mean): --  RR: 18 (17 Feb 2019 12:25) (18 - 18)  SpO2: 93% (17 Feb 2019 12:25) (93% - 98%)  CAPILLARY BLOOD GLUCOSE          02-16 @ 07:01  -  02-17 @ 07:00  --------------------------------------------------------  IN: 1990 mL / OUT: 0 mL / NET: 1990 mL    02-17 @ 07:01  -  02-17 @ 18:13  --------------------------------------------------------  IN: 780 mL / OUT: 0 mL / NET: 780 mL      Physical Exam:     General:  cachetic  HEENT:  supple  CV:  RRR, S1S2   Lungs:  poor effort    Abdomen:  Soft, non-tender, no distended, positive BS  Extremities:  no edema                                                                                                                                                                                                                                                                                                LABS:                               11.8   5.46  )-----------( 249      ( 17 Feb 2019 13:45 )             37.2                      02-17    145  |  107  |  24<H>  ----------------------------<  96  4.1   |  25  |  0.97    Ca    9.1      17 Feb 2019 10:52

## 2019-02-17 NOTE — PHYSICAL THERAPY INITIAL EVALUATION ADULT - ADDITIONAL COMMENTS
pt is confused and a poor historian, As per the medical chart pt lives with spouse in a private house-has 9 BRYCE, needs assistance with ADL's, self care and mobility. DME at home: +hospital bed, stair  chair, shower bench, walker, wheelchair and transport chair.

## 2019-02-17 NOTE — PROVIDER CONTACT NOTE (OTHER) - ASSESSMENT
pt is A+Ox1; confused and restless. bladder distended. pt denies pain. pt denies urge to void at this time. bedpan attempted. bladder scan performed and revealed 291. small amount of pad saturated underneath

## 2019-02-17 NOTE — PHYSICAL THERAPY INITIAL EVALUATION ADULT - STRENGTHENING, PT EVAL
pt will demonstrate fair standing balance to improve safety and decrease LOB during functional mobility in 4weeks

## 2019-02-17 NOTE — PROGRESS NOTE ADULT - SUBJECTIVE AND OBJECTIVE BOX
CARDIOLOGY     PROGRESS  NOTE   ________________________________________________    CHIEF COMPLAINT:Patient is a 89y old  Female who presents with a chief complaint of le edema/failure to thrive (16 Feb 2019 19:26)  no complain.  	  REVIEW OF SYSTEMS:  CONSTITUTIONAL: No fever, weight loss, or fatigue  EYES: No eye pain, visual disturbances, or discharge  ENT:  No difficulty hearing, tinnitus, vertigo; No sinus or throat pain  NECK: No pain or stiffness  RESPIRATORY: No cough, wheezing, chills or hemoptysis; No Shortness of Breath  CARDIOVASCULAR: No chest pain, palpitations, passing out, dizziness, or leg swelling  GASTROINTESTINAL: No abdominal or epigastric pain. No nausea, vomiting, or hematemesis; No diarrhea or constipation. No melena or hematochezia.  GENITOURINARY: No dysuria, frequency, hematuria, or incontinence  NEUROLOGICAL: No headaches, memory loss, loss of strength, numbness, or tremors  SKIN: No itching, burning, rashes, or lesions   LYMPH Nodes: No enlarged glands  ENDOCRINE: No heat or cold intolerance; No hair loss  MUSCULOSKELETAL: No joint pain or swelling; No muscle, back, or extremity pain  PSYCHIATRIC: No depression, anxiety, mood swings, or difficulty sleeping  HEME/LYMPH: No easy bruising, or bleeding gums  ALLERGY AND IMMUNOLOGIC: No hives or eczema	    [ ] All others negative	  [ ] Unable to obtain    PHYSICAL EXAM:  T(C): 36.4 (02-17-19 @ 06:37), Max: 36.4 (02-16-19 @ 14:16)  HR: 65 (02-17-19 @ 06:37) (65 - 72)  BP: 110/70 (02-17-19 @ 06:37) (102/69 - 125/79)  RR: 18 (02-17-19 @ 06:37) (18 - 18)  SpO2: 95% (02-17-19 @ 06:37) (95% - 99%)  Wt(kg): --  I&O's Summary    16 Feb 2019 07:01  -  17 Feb 2019 07:00  --------------------------------------------------------  IN: 1990 mL / OUT: 0 mL / NET: 1990 mL        Appearance: Normal	  HEENT:   Normal oral mucosa, PERRL, EOMI	  Lymphatic: No lymphadenopathy  Cardiovascular: Normal S1 S2, No JVD,+ murmurs, No edema  Respiratory: Lungs clear to auscultation	  Psychiatry: A & O x 3, Mood & affect appropriate  Gastrointestinal:  Soft, Non-tender, + BS	  Skin: No rashes, No ecchymoses, No cyanosis	  Neurologic: Non-focal  Extremities: Normal range of motion, No clubbing, cyanosis or edema  Vascular: Peripheral pulses palpable 2+ bilaterally    MEDICATIONS  (STANDING):  heparin  Injectable 5000 Unit(s) SubCutaneous every 12 hours  levothyroxine 50 MICROGram(s) Oral daily  nystatin    Suspension 596409 Unit(s) Oral four times a day  pantoprazole Infusion 8 mG/Hr (10 mL/Hr) IV Continuous <Continuous>  sodium chloride 0.9%. 1000 milliLiter(s) (50 mL/Hr) IV Continuous <Continuous>  sucralfate suspension 1 Gram(s) Oral four times a day      TELEMETRY: 	    ECG:  	  RADIOLOGY:  OTHER: 	  	  LABS:	 	    CARDIAC MARKERS:                                12.8   7.66  )-----------( 230      ( 16 Feb 2019 09:04 )             39.7     02-16    143  |  104  |  25<H>  ----------------------------<  85  3.9   |  21<L>  |  0.88    Ca    9.5      16 Feb 2019 07:35      proBNP: Serum Pro-Brain Natriuretic Peptide: 2551 pg/mL (02-14 @ 03:31)    Lipid Profile:   HgA1c:   TSH: Thyroid Stimulating Hormone, Serum: 5.70 uIU/mL (02-14 @ 05:28)          Assessment and plan  ---------------------------  doing better  appetite is improving  dc planning if ok by med/gi

## 2019-02-17 NOTE — PROGRESS NOTE ADULT - ASSESSMENT
89 y/o female hx of gastric cancer and hypothyroid admitted with dysphagia/  acute GI bleed and weight loss     1- GI Bleed : monitor H/H   PPI IV   avoid NSAIDS   transfuse PRN     2- dysphagia : EGD: < from: Upper Endoscopy (02.14.19 @ 16:27) >     -very tortuous ues, ? exterinsic compression       -? monilial candidiasis       -ulcer at anastamosis, s/p gastric resection                                                                                                        Impression:          - rx candida, will get esopahgram. carafate  Recommendation:      - Await pathology results.    cont nystatin          3- hypothyroid: cont meds   acceptable range     4- Anemia : monitor for now     5- edema : likely thrid spacing however consdier VA duplex r/o DVT     PAS 89 y/o female hx of gastric cancer and hypothyroid admitted with dysphagia/  acute GI bleed and weight loss     1- GI Bleed : monitor H/H   PPI IV   avoid NSAIDS   transfuse PRN     2- dysphagia : EGD: < from: Upper Endoscopy (02.14.19 @ 16:27) >     -very tortuous ues, ? exterinsic compression       -? monilial candidiasis       -ulcer at anastamosis, s/p gastric resection                                                                                                        Impression:          - rx candida, will get esopahgram. carafate  Recommendation:      - Await pathology results.    cont nystatin   esophagram noted: pt had an episode of aspiration prior tbe given the pill          3- hypothyroid: cont meds   acceptable range     4- Anemia : monitor for now     5- edema : likely thrid spacing however consdier VA duplex r/o DVT     PAS

## 2019-02-17 NOTE — PROGRESS NOTE ADULT - SUBJECTIVE AND OBJECTIVE BOX
PRISCILLA RICHARDSON:[id number],   89yFemale followed for:  No Known Drug Allergies  shellfish (Anaphylaxis)    PAST MEDICAL & SURGICAL HISTORY:  Stomach cancer: 2003  Kidney stone  Hypothyroid  History of ovarian cyst: 2009    FAMILY HISTORY:    MEDICATIONS  (STANDING):  heparin  Injectable 5000 Unit(s) SubCutaneous every 12 hours  levothyroxine 50 MICROGram(s) Oral daily  nystatin    Suspension 682268 Unit(s) Oral four times a day  pantoprazole Infusion 8 mG/Hr (10 mL/Hr) IV Continuous <Continuous>  sodium chloride 0.9%. 1000 milliLiter(s) (50 mL/Hr) IV Continuous <Continuous>  sucralfate suspension 1 Gram(s) Oral four times a day    MEDICATIONS  (PRN):  senna 2 Tablet(s) Oral at bedtime PRN Constipation      Vital Signs Last 24 Hrs  T(C): 36.4 (17 Feb 2019 06:37), Max: 36.4 (16 Feb 2019 14:16)  T(F): 97.6 (17 Feb 2019 06:37), Max: 97.6 (17 Feb 2019 02:05)  HR: 65 (17 Feb 2019 06:37) (65 - 72)  BP: 110/70 (17 Feb 2019 06:37) (102/69 - 125/79)  BP(mean): --  RR: 18 (17 Feb 2019 06:37) (18 - 18)  SpO2: 95% (17 Feb 2019 06:37) (95% - 99%)  nc/at  s1s2  cta  soft, nt, nd no guarding or rebound  no c/c/e    CBC Full  -  ( 16 Feb 2019 09:04 )  WBC Count : 7.66 K/uL  Hemoglobin : 12.8 g/dL  Hematocrit : 39.7 %  Platelet Count - Automated : 230 K/uL  Mean Cell Volume : 96.4 fl  Mean Cell Hemoglobin : 31.1 pg  Mean Cell Hemoglobin Concentration : 32.2 gm/dL  Auto Neutrophil # : x  Auto Lymphocyte # : x  Auto Monocyte # : x  Auto Eosinophil # : x  Auto Basophil # : x  Auto Neutrophil % : x  Auto Lymphocyte % : x  Auto Monocyte % : x  Auto Eosinophil % : x  Auto Basophil % : x    02-16    143  |  104  |  25<H>  ----------------------------<  85  3.9   |  21<L>  |  0.88    Ca    9.5      16 Feb 2019 07:35

## 2019-02-17 NOTE — PHYSICAL THERAPY INITIAL EVALUATION ADULT - TRANSFER SAFETY CONCERNS NOTED: SIT/STAND, REHAB EVAL
decreased safety awareness/decreased sequencing ability/decreased balance during turns/losing balance

## 2019-02-18 LAB
ANION GAP SERPL CALC-SCNC: 18 MMOL/L — HIGH (ref 5–17)
BUN SERPL-MCNC: 28 MG/DL — HIGH (ref 7–23)
CALCIUM SERPL-MCNC: 9.5 MG/DL — SIGNIFICANT CHANGE UP (ref 8.4–10.5)
CHLORIDE SERPL-SCNC: 106 MMOL/L — SIGNIFICANT CHANGE UP (ref 96–108)
CK SERPL-CCNC: 1415 U/L — HIGH (ref 25–170)
CO2 SERPL-SCNC: 21 MMOL/L — LOW (ref 22–31)
CREAT SERPL-MCNC: 1.28 MG/DL — SIGNIFICANT CHANGE UP (ref 0.5–1.3)
GLUCOSE SERPL-MCNC: 89 MG/DL — SIGNIFICANT CHANGE UP (ref 70–99)
HCT VFR BLD CALC: 38.5 % — SIGNIFICANT CHANGE UP (ref 34.5–45)
HGB BLD-MCNC: 11.9 G/DL — SIGNIFICANT CHANGE UP (ref 11.5–15.5)
MCHC RBC-ENTMCNC: 30.2 PG — SIGNIFICANT CHANGE UP (ref 27–34)
MCHC RBC-ENTMCNC: 30.9 GM/DL — LOW (ref 32–36)
MCV RBC AUTO: 97.7 FL — SIGNIFICANT CHANGE UP (ref 80–100)
PLATELET # BLD AUTO: 237 K/UL — SIGNIFICANT CHANGE UP (ref 150–400)
POTASSIUM SERPL-MCNC: 4.7 MMOL/L — SIGNIFICANT CHANGE UP (ref 3.5–5.3)
POTASSIUM SERPL-SCNC: 4.7 MMOL/L — SIGNIFICANT CHANGE UP (ref 3.5–5.3)
RBC # BLD: 3.94 M/UL — SIGNIFICANT CHANGE UP (ref 3.8–5.2)
RBC # FLD: 14.2 % — SIGNIFICANT CHANGE UP (ref 10.3–14.5)
SODIUM SERPL-SCNC: 145 MMOL/L — SIGNIFICANT CHANGE UP (ref 135–145)
WBC # BLD: 8.73 K/UL — SIGNIFICANT CHANGE UP (ref 3.8–10.5)
WBC # FLD AUTO: 8.73 K/UL — SIGNIFICANT CHANGE UP (ref 3.8–10.5)

## 2019-02-18 PROCEDURE — 70450 CT HEAD/BRAIN W/O DYE: CPT | Mod: 26

## 2019-02-18 PROCEDURE — 71045 X-RAY EXAM CHEST 1 VIEW: CPT | Mod: 26

## 2019-02-18 PROCEDURE — 71250 CT THORAX DX C-: CPT | Mod: 26

## 2019-02-18 RX ORDER — LEVOTHYROXINE SODIUM 125 MCG
25 TABLET ORAL ONCE
Qty: 0 | Refills: 0 | Status: COMPLETED | OUTPATIENT
Start: 2019-02-18 | End: 2019-02-18

## 2019-02-18 RX ORDER — IPRATROPIUM/ALBUTEROL SULFATE 18-103MCG
3 AEROSOL WITH ADAPTER (GRAM) INHALATION EVERY 6 HOURS
Qty: 0 | Refills: 0 | Status: DISCONTINUED | OUTPATIENT
Start: 2019-02-18 | End: 2019-02-23

## 2019-02-18 RX ORDER — SODIUM CHLORIDE 9 MG/ML
1000 INJECTION, SOLUTION INTRAVENOUS
Qty: 0 | Refills: 0 | Status: DISCONTINUED | OUTPATIENT
Start: 2019-02-18 | End: 2019-02-19

## 2019-02-18 RX ADMIN — Medication 25 MICROGRAM(S): at 21:59

## 2019-02-18 RX ADMIN — SODIUM CHLORIDE 60 MILLILITER(S): 9 INJECTION, SOLUTION INTRAVENOUS at 16:52

## 2019-02-18 RX ADMIN — HEPARIN SODIUM 5000 UNIT(S): 5000 INJECTION INTRAVENOUS; SUBCUTANEOUS at 06:58

## 2019-02-18 RX ADMIN — Medication 3 MILLILITER(S): at 09:20

## 2019-02-18 RX ADMIN — PANTOPRAZOLE SODIUM 10 MG/HR: 20 TABLET, DELAYED RELEASE ORAL at 06:58

## 2019-02-18 RX ADMIN — HEPARIN SODIUM 5000 UNIT(S): 5000 INJECTION INTRAVENOUS; SUBCUTANEOUS at 16:28

## 2019-02-18 RX ADMIN — Medication 3 MILLILITER(S): at 23:12

## 2019-02-18 RX ADMIN — Medication 3 MILLILITER(S): at 16:28

## 2019-02-18 NOTE — CONSULT NOTE ADULT - SUBJECTIVE AND OBJECTIVE BOX
Chart reviewed and patient seen by undersigned    Asked to consult for psychiatric medication recommendations    Historians include chart, the pt's son at bedside, Dr. Urias. The pt. is unable to give any history    History of Present Illness  The patient is a 89 year old  WF. Son says she has no prior psychiatric history and was doing well until two weeks ago. She started sundowning, lost weight, and became disoriented. She was admitted here on 2/14/19 for lower extremity edema and failure to thrive. Per Dr. Urias, no active bleed, but may have had a recent GI bleed prior to admission. The pt. was agitated late night on 2/16/19 and given Haldol 0.50mg IV and a few hours later still agitated and given Haldol 1mg IV. Pt. became more agitated and restless. Afebrile the past few days but CPK today is 1415.     Past Psychiatric History  None other than above. Not on psychiatric meds nor dopamine blocking agents until Haldol given this admission. Son admits that for the past 3 months the pt. has "lost track" of how to pay bills.     Psychosocial History  Lives with . No cigarette smoking, alcohol use/abuse, illicit drug use/abuse.     PAST MEDICAL & SURGICAL HISTORY:  Stomach cancer: 2003  Kidney stone  Hypothyroid  History of ovarian cyst: 2009      FAMILY HISTORY:      Vital Signs Last 24 Hrs  T(C): 36.9 (18 Feb 2019 16:15), Max: 37.1 (18 Feb 2019 11:15)  T(F): 98.4 (18 Feb 2019 16:15), Max: 98.8 (18 Feb 2019 11:15)  HR: 107 (18 Feb 2019 16:15) (96 - 107)  BP: 145/52 (18 Feb 2019 16:15) (111/72 - 145/66)  BP(mean): --  RR: 18 (18 Feb 2019 16:15) (18 - 20)  SpO2: 94% (18 Feb 2019 16:15) (93% - 96%)                          11.9   8.73  )-----------( 237      ( 18 Feb 2019 11:19 )             38.5       02-18    145  |  106  |  28<H>  ----------------------------<  89  4.7   |  21<L>  |  1.28    Ca    9.5      18 Feb 2019 10:19        Thyroid Stimulating Hormone, Serum: 5.70 uIU/mL (02.14.19 @ 05:28)    Triiodothyronine, Total (T3 Total): 51 ng/dL (02.14.19 @ 05:28)    < from: CT Head No Cont (02.18.19 @ 15:13) >    EXAM:  CT BRAIN                            PROCEDURE DATE:  02/18/2019            INTERPRETATION:  CLINICAL INFORMATION: Change in mental status.    TECHNIQUE: Noncontrast axial CT images were acquired through the head.   Two-dimensional sagittal and coronal reformats were generated.    COMPARISON STUDY: Noncontrast head CT from 9/28/2016.    FINDINGS:   There is no CT evidence of acute intracranial hemorrhage, extra-axial   collection, vasogenic edema, mass effect, midline shift, central   herniation, or hydrocephalus.     There is mild age-appropriate cerebral volume loss. There is patchy white   matter hypoattenuation which is nonspecific in etiology but likely   related to chronic microvascular ischemic disease.    The visualized paranasal sinuses are clear. The mastoid air cells and   middle ear cavities are clear.    The soft tissues of the scalp are unremarkable. The calvarium is intact.    IMPRESSION:   No CT evidence of acute intracranial hemorrhage, brain edema, or mass   effect.   Urine Microscopic-Add On (NC) (02.16.19 @ 10:35)    Red Blood Cell - Urine: 4 /hpf    White Blood Cell - Urine: 2 /HPF    Hyaline Casts: 1 /lpf    Bacteria: Moderate    Epithelial Cells: 1 /hpf            < end of copied text >        MEDICATIONS  (STANDING):  ALBUTerol/ipratropium for Nebulization 3 milliLiter(s) Nebulizer every 6 hours  dextrose 5% + sodium chloride 0.45%. 1000 milliLiter(s) (50 mL/Hr) IV Continuous <Continuous>  dextrose 5% + sodium chloride 0.9%. 1000 milliLiter(s) (60 mL/Hr) IV Continuous <Continuous>  heparin  Injectable 5000 Unit(s) SubCutaneous every 12 hours  levothyroxine 50 MICROGram(s) Oral daily  levothyroxine Injectable 25 MICROGram(s) IV Push once  nystatin    Suspension [id number] Unit(s) Oral four times a day  pantoprazole Infusion 8 mG/Hr (10 mL/Hr) IV Continuous <Continuous>  sucralfate suspension 1 Gram(s) Oral four times a day    MEDICATIONS  (PRN):  senna 2 Tablet(s) Oral at bedtime PRN Constipation      Elderly WF in bed, fluctuating levels of arousal. Awakened by my voice and touch of son at bedside. She is oriented x0. No diaphoresis nor tremors. She is positioned diagonally in bed and left hand grasps the side rail and right arm is extended toward the back of her head. She resists my trying to flex her arm to test for rigidity. She has some restlessness of legs at times and her tongue darts out on occasion. Noted to have no upper teeth and lower teeth protrude.  Insight and judgment are poor. Speech is dysarthric and incoherent with slow rate and low volume. No hallucinations nor delusions. The patient could not cooperate to answer about  suicidal and homicidal ideation and plan. Mood is unknown as she does not answer and affect flat. Impaired Attention and concentration, short term memory, and long term memory.    Suicidal risk assessment  Risk factors include  Protective factors include

## 2019-02-18 NOTE — CONSULT NOTE ADULT - ASSESSMENT
Delirium of unknown etiology  Rule out dementia  Extrapyramidal side effects from Haloperidol but doubt NMS as she is afebrile.  Would avoid anticholinergic agents such as Benztropine as it will worsen her confusion and tachycardia    Recommend  Check B12, folate.  Follow CPK. If pt. spikes temp, would get MICU consultation  No neuroleptics  Check vital signs q6h   Will follow here with you.    Armando Blanca M.D.  Psychiatry  [phone]

## 2019-02-18 NOTE — PROVIDER CONTACT NOTE (CHANGE IN STATUS NOTIFICATION) - ASSESSMENT
pt is A+Ox1; confused but has been lethargic since yesterday. upon am assessment; pt voice sounds "gurgly". 02 saturation 89 on room air; 2L o2 NC placed and pt saturation increased to 94%. pt tach with HR of 105 as pt is restless and gripping onto side rails. unable to reorient or console pt. attempted to suction pt with little success as pt is biting down on suction catheter and attempting to hit staff. pt is A+Ox1; confused but has been lethargic since yesterday. upon am assessment; pt voice sounds "gurgly". 02 saturation 89 on room air; 2L o2 NC placed and pt saturation increased to 94%. pt tach with HR of 105 as pt is restless and gripping onto side rails. unable to reorient or console pt. attempted to suction pt with little success as pt is biting down on suction catheter and attempting to hit staff. pt is a DNR status

## 2019-02-18 NOTE — PROGRESS NOTE ADULT - SUBJECTIVE AND OBJECTIVE BOX
CARDIOLOGY     PROGRESS  NOTE   ________________________________________________    CHIEF COMPLAINT:Patient is a 89y old  Female who presents with a chief complaint of le edema/failure to thrive (18 Feb 2019 08:29)  no complain.  	  REVIEW OF SYSTEMS:  CONSTITUTIONAL: No fever, weight loss, or fatigue  EYES: No eye pain, visual disturbances, or discharge  ENT:  No difficulty hearing, tinnitus, vertigo; No sinus or throat pain  NECK: No pain or stiffness  RESPIRATORY: No cough, wheezing, chills or hemoptysis; No Shortness of Breath  CARDIOVASCULAR: No chest pain, palpitations, passing out, dizziness, or leg swelling  GASTROINTESTINAL: No abdominal or epigastric pain. No nausea, vomiting, or hematemesis; No diarrhea or constipation. No melena or hematochezia.  GENITOURINARY: No dysuria, frequency, hematuria, or incontinence  NEUROLOGICAL: No headaches, memory loss, loss of strength, numbness, or tremors  SKIN: No itching, burning, rashes, or lesions   LYMPH Nodes: No enlarged glands  ENDOCRINE: No heat or cold intolerance; No hair loss  MUSCULOSKELETAL: No joint pain or swelling; No muscle, back, or extremity pain  PSYCHIATRIC: No depression, anxiety, mood swings, or difficulty sleeping  HEME/LYMPH: No easy bruising, or bleeding gums  ALLERGY AND IMMUNOLOGIC: No hives or eczema	    [ ] All others negative	  [ ] Unable to obtain    PHYSICAL EXAM:  T(C): 36.6 (02-18-19 @ 06:14), Max: 36.8 (02-18-19 @ 00:17)  HR: 98 (02-18-19 @ 07:53) (92 - 105)  BP: 145/66 (02-18-19 @ 06:14) (117/66 - 145/66)  RR: 20 (02-18-19 @ 07:53) (18 - 20)  SpO2: 94% (02-18-19 @ 07:53) (93% - 94%)  Wt(kg): --  I&O's Summary    17 Feb 2019 07:01  -  18 Feb 2019 07:00  --------------------------------------------------------  IN: 1840 mL / OUT: 700 mL / NET: 1140 mL        Appearance: Normal	  HEENT:   Normal oral mucosa, PERRL, EOMI	  Lymphatic: No lymphadenopathy  Cardiovascular: Normal S1 S2, No JVD, No murmurs, No edema  Respiratory: Lungs clear to auscultation	  Psychiatry: A & O x 3, Mood & affect appropriate  Gastrointestinal:  Soft, Non-tender, + BS	  Skin: No rashes, No ecchymoses, No cyanosis	  Neurologic: Non-focal  Extremities: Normal range of motion, No clubbing, cyanosis or edema  Vascular: Peripheral pulses palpable 2+ bilaterally    MEDICATIONS  (STANDING):  dextrose 5% + sodium chloride 0.45%. 1000 milliLiter(s) (50 mL/Hr) IV Continuous <Continuous>  heparin  Injectable 5000 Unit(s) SubCutaneous every 12 hours  levothyroxine 50 MICROGram(s) Oral daily  levothyroxine Injectable 25 MICROGram(s) IV Push once  nystatin    Suspension [id number] Unit(s) Oral four times a day  pantoprazole Infusion 8 mG/Hr (10 mL/Hr) IV Continuous <Continuous>  sucralfate suspension 1 Gram(s) Oral four times a day      TELEMETRY: 	    ECG:  	  RADIOLOGY:  OTHER: 	  	  LABS:	 	    CARDIAC MARKERS:                                11.8   5.46  )-----------( 249      ( 17 Feb 2019 13:45 )             37.2     02-17    145  |  107  |  24<H>  ----------------------------<  96  4.1   |  25  |  0.97    Ca    9.1      17 Feb 2019 10:52      proBNP: Serum Pro-Brain Natriuretic Peptide: 2551 pg/mL (02-14 @ 03:31)    Lipid Profile:   HgA1c:   TSH: Thyroid Stimulating Hormone, Serum: 5.70 uIU/mL (02-14 @ 05:28)          Assessment and plan  ---------------------------  pt with sig low calorie intake and sig malnutrition  discussed with family regarding peg  will discuss with GI

## 2019-02-18 NOTE — PROGRESS NOTE ADULT - ASSESSMENT
tortuous esohagus, appreciate barrium. speech and swallow re: consistency , no peg secondary to resection.

## 2019-02-18 NOTE — PROGRESS NOTE ADULT - SUBJECTIVE AND OBJECTIVE BOX
PRISCILLA RICHARDSON:[id number],   89yFemale followed for:  No Known Drug Allergies  shellfish (Anaphylaxis)    PAST MEDICAL & SURGICAL HISTORY:  Stomach cancer: 2003  Kidney stone  Hypothyroid  History of ovarian cyst: 2009    FAMILY HISTORY:    MEDICATIONS  (STANDING):  dextrose 5% + sodium chloride 0.45%. 1000 milliLiter(s) (50 mL/Hr) IV Continuous <Continuous>  heparin  Injectable 5000 Unit(s) SubCutaneous every 12 hours  levothyroxine 50 MICROGram(s) Oral daily  levothyroxine Injectable 25 MICROGram(s) IV Push once  nystatin    Suspension [id number] Unit(s) Oral four times a day  pantoprazole Infusion 8 mG/Hr (10 mL/Hr) IV Continuous <Continuous>  sucralfate suspension 1 Gram(s) Oral four times a day    MEDICATIONS  (PRN):  senna 2 Tablet(s) Oral at bedtime PRN Constipation      Vital Signs Last 24 Hrs  T(C): 36.6 (18 Feb 2019 06:14), Max: 36.8 (18 Feb 2019 00:17)  T(F): 97.8 (18 Feb 2019 06:14), Max: 98.2 (18 Feb 2019 00:17)  HR: 98 (18 Feb 2019 07:53) (92 - 105)  BP: 145/66 (18 Feb 2019 06:14) (117/66 - 145/66)  BP(mean): --  RR: 20 (18 Feb 2019 07:53) (18 - 20)  SpO2: 94% (18 Feb 2019 07:53) (93% - 94%)  nc/at  s1s2  cta  soft, nt, nd no guarding or rebound  no c/c/e    CBC Full  -  ( 17 Feb 2019 13:45 )  WBC Count : 5.46 K/uL  Hemoglobin : 11.8 g/dL  Hematocrit : 37.2 %  Platelet Count - Automated : 249 K/uL  Mean Cell Volume : 97.4 fl  Mean Cell Hemoglobin : 30.9 pg  Mean Cell Hemoglobin Concentration : 31.7 gm/dL  Auto Neutrophil # : x  Auto Lymphocyte # : x  Auto Monocyte # : x  Auto Eosinophil # : x  Auto Basophil # : x  Auto Neutrophil % : x  Auto Lymphocyte % : x  Auto Monocyte % : x  Auto Eosinophil % : x  Auto Basophil % : x    02-17    145  |  107  |  24<H>  ----------------------------<  96  4.1   |  25  |  0.97    Ca    9.1      17 Feb 2019 10:52

## 2019-02-18 NOTE — PROGRESS NOTE ADULT - ASSESSMENT
89 y/o female hx of gastric cancer and hypothyroid admitted with dysphagia/  acute GI bleed and weight loss     1- GI Bleed : monitor H/H   PPI IV   avoid NSAIDS   transfuse PRN     2- dysphagia : EGD: < from: Upper Endoscopy (02.14.19 @ 16:27) >     -very tortuous ues, ? exterinsic compression       -? monilial candidiasis       -ulcer at anastamosis, s/p gastric resection                                                                                                        Impression:          - rx candida, will get esopahgram. carafate  Recommendation:      - Await pathology results.    cont nystatin   esophagram noted: pt had an episode of aspiration prior tbe given the pill          3- hypothyroid: cont meds   acceptable range     4- Anemia : monitor for now     5- edema : likely thrid spacing however consdier VA duplex r/o DVT     6- Encephalopathy :   unclear etiology however will need to consider NMS and other etiologies as in occult infection /asp pna etc   will check rectal temp check CK and procal   check CT head  check Ct chest  psych input   avoid haldol     discussed with son and    and later with  and daughter   PAS

## 2019-02-18 NOTE — PROVIDER CONTACT NOTE (OTHER) - ASSESSMENT
pt is A+Ox1. had an episode of incontinence during the night, but upon am assessment, pt did not void. bladder is distended. bladder scan reveals 458.

## 2019-02-18 NOTE — PROGRESS NOTE ADULT - SUBJECTIVE AND OBJECTIVE BOX
Patient is a 89y old  Female who presents with a chief complaint of le edema/failure to thrive (18 Feb 2019 17:03)                                                               INTERVAL HPI/OVERNIGHT EVENTS:    REVIEW OF SYSTEMS:     lethargic  arousable but dooes not follow commands                                                                                                                                                                                                                                                                                Medications:  MEDICATIONS  (STANDING):  ALBUTerol/ipratropium for Nebulization 3 milliLiter(s) Nebulizer every 6 hours  dextrose 5% + sodium chloride 0.45%. 1000 milliLiter(s) (50 mL/Hr) IV Continuous <Continuous>  dextrose 5% + sodium chloride 0.9%. 1000 milliLiter(s) (60 mL/Hr) IV Continuous <Continuous>  heparin  Injectable 5000 Unit(s) SubCutaneous every 12 hours  levothyroxine 50 MICROGram(s) Oral daily  levothyroxine Injectable 25 MICROGram(s) IV Push once  nystatin    Suspension 355414 Unit(s) Oral four times a day  pantoprazole Infusion 8 mG/Hr (10 mL/Hr) IV Continuous <Continuous>  sucralfate suspension 1 Gram(s) Oral four times a day    MEDICATIONS  (PRN):  senna 2 Tablet(s) Oral at bedtime PRN Constipation       Allergies    No Known Drug Allergies  shellfish (Anaphylaxis)    Intolerances      Vital Signs Last 24 Hrs  T(C): 36.3 (18 Feb 2019 19:49), Max: 37.1 (18 Feb 2019 11:15)  T(F): 97.3 (18 Feb 2019 19:49), Max: 98.8 (18 Feb 2019 11:15)  HR: 100 (18 Feb 2019 19:49) (96 - 107)  BP: 113/64 (18 Feb 2019 19:49) (111/72 - 145/66)  BP(mean): --  RR: 18 (18 Feb 2019 19:49) (18 - 20)  SpO2: 96% (18 Feb 2019 19:49) (93% - 96%)  CAPILLARY BLOOD GLUCOSE          02-17 @ 07:01  -  02-18 @ 07:00  --------------------------------------------------------  IN: 1840 mL / OUT: 700 mL / NET: 1140 mL    02-18 @ 07:01 - 02-18 @ 20:16  --------------------------------------------------------  IN: 650 mL / OUT: 0 mL / NET: 650 mL      Physical Exam:    General:   cachetic  HEENT:  Nonicteric, PERRLA  CV:  RRR, S1S2   Lungs: poor effort    Abdomen:  Soft, NT  Extremities:  2+ pulses, no c/c, no edema  Skin:  Warm and dry, no rashes  :  No mendez  Neuro: disoriented    does not follow commands  family do not wish for me to complete neuro exam                                                                                                                                                                                                                                                                                      LABS:                               11.9   8.73  )-----------( 237      ( 18 Feb 2019 11:19 )             38.5                      02-18    145  |  106  |  28<H>  ----------------------------<  89  4.7   |  21<L>  |  1.28    Ca    9.5      18 Feb 2019 10:19

## 2019-02-19 DIAGNOSIS — K92.2 GASTROINTESTINAL HEMORRHAGE, UNSPECIFIED: ICD-10-CM

## 2019-02-19 DIAGNOSIS — Z71.89 OTHER SPECIFIED COUNSELING: ICD-10-CM

## 2019-02-19 DIAGNOSIS — R41.0 DISORIENTATION, UNSPECIFIED: ICD-10-CM

## 2019-02-19 DIAGNOSIS — R47.02 DYSPHASIA: ICD-10-CM

## 2019-02-19 LAB
ANION GAP SERPL CALC-SCNC: 13 MMOL/L — SIGNIFICANT CHANGE UP (ref 5–17)
BUN SERPL-MCNC: 30 MG/DL — HIGH (ref 7–23)
CALCIUM SERPL-MCNC: 9.5 MG/DL — SIGNIFICANT CHANGE UP (ref 8.4–10.5)
CHLORIDE SERPL-SCNC: 111 MMOL/L — HIGH (ref 96–108)
CK SERPL-CCNC: 1109 U/L — HIGH (ref 25–170)
CO2 SERPL-SCNC: 23 MMOL/L — SIGNIFICANT CHANGE UP (ref 22–31)
CREAT SERPL-MCNC: 1.38 MG/DL — HIGH (ref 0.5–1.3)
GLUCOSE SERPL-MCNC: 141 MG/DL — HIGH (ref 70–99)
HCT VFR BLD CALC: 37.5 % — SIGNIFICANT CHANGE UP (ref 34.5–45)
HGB BLD-MCNC: 11.7 G/DL — SIGNIFICANT CHANGE UP (ref 11.5–15.5)
MAGNESIUM SERPL-MCNC: 1.7 MG/DL — SIGNIFICANT CHANGE UP (ref 1.6–2.6)
MCHC RBC-ENTMCNC: 30.7 PG — SIGNIFICANT CHANGE UP (ref 27–34)
MCHC RBC-ENTMCNC: 31.2 GM/DL — LOW (ref 32–36)
MCV RBC AUTO: 98.4 FL — SIGNIFICANT CHANGE UP (ref 80–100)
PHOSPHATE SERPL-MCNC: 3.9 MG/DL — SIGNIFICANT CHANGE UP (ref 2.5–4.5)
PLATELET # BLD AUTO: 190 K/UL — SIGNIFICANT CHANGE UP (ref 150–400)
POTASSIUM SERPL-MCNC: 3.9 MMOL/L — SIGNIFICANT CHANGE UP (ref 3.5–5.3)
POTASSIUM SERPL-SCNC: 3.9 MMOL/L — SIGNIFICANT CHANGE UP (ref 3.5–5.3)
RBC # BLD: 3.81 M/UL — SIGNIFICANT CHANGE UP (ref 3.8–5.2)
RBC # FLD: 14.6 % — HIGH (ref 10.3–14.5)
SODIUM SERPL-SCNC: 147 MMOL/L — HIGH (ref 135–145)
WBC # BLD: 8.53 K/UL — SIGNIFICANT CHANGE UP (ref 3.8–10.5)
WBC # FLD AUTO: 8.53 K/UL — SIGNIFICANT CHANGE UP (ref 3.8–10.5)

## 2019-02-19 PROCEDURE — 99497 ADVNCD CARE PLAN 30 MIN: CPT | Mod: 25

## 2019-02-19 PROCEDURE — 99498 ADVNCD CARE PLAN ADDL 30 MIN: CPT | Mod: 25

## 2019-02-19 PROCEDURE — 99223 1ST HOSP IP/OBS HIGH 75: CPT | Mod: GC

## 2019-02-19 RX ORDER — SODIUM CHLORIDE 9 MG/ML
1000 INJECTION, SOLUTION INTRAVENOUS
Qty: 0 | Refills: 0 | Status: DISCONTINUED | OUTPATIENT
Start: 2019-02-19 | End: 2019-02-19

## 2019-02-19 RX ORDER — SODIUM CHLORIDE 9 MG/ML
1000 INJECTION, SOLUTION INTRAVENOUS
Qty: 0 | Refills: 0 | Status: DISCONTINUED | OUTPATIENT
Start: 2019-02-19 | End: 2019-02-21

## 2019-02-19 RX ORDER — PANTOPRAZOLE SODIUM 20 MG/1
40 TABLET, DELAYED RELEASE ORAL
Qty: 0 | Refills: 0 | Status: DISCONTINUED | OUTPATIENT
Start: 2019-02-19 | End: 2019-02-23

## 2019-02-19 RX ADMIN — HEPARIN SODIUM 5000 UNIT(S): 5000 INJECTION INTRAVENOUS; SUBCUTANEOUS at 16:39

## 2019-02-19 RX ADMIN — PANTOPRAZOLE SODIUM 10 MG/HR: 20 TABLET, DELAYED RELEASE ORAL at 05:09

## 2019-02-19 RX ADMIN — Medication 500000 UNIT(S): at 06:34

## 2019-02-19 RX ADMIN — HEPARIN SODIUM 5000 UNIT(S): 5000 INJECTION INTRAVENOUS; SUBCUTANEOUS at 05:07

## 2019-02-19 RX ADMIN — Medication 1 GRAM(S): at 06:34

## 2019-02-19 RX ADMIN — Medication 3 MILLILITER(S): at 05:07

## 2019-02-19 RX ADMIN — Medication 3 MILLILITER(S): at 23:13

## 2019-02-19 RX ADMIN — SODIUM CHLORIDE 80 MILLILITER(S): 9 INJECTION, SOLUTION INTRAVENOUS at 23:12

## 2019-02-19 RX ADMIN — Medication 500000 UNIT(S): at 23:13

## 2019-02-19 RX ADMIN — Medication 50 MICROGRAM(S): at 06:34

## 2019-02-19 RX ADMIN — SODIUM CHLORIDE 80 MILLILITER(S): 9 INJECTION, SOLUTION INTRAVENOUS at 10:36

## 2019-02-19 RX ADMIN — Medication 1 GRAM(S): at 23:13

## 2019-02-19 NOTE — GOALS OF CARE CONVERSATION - PERSONAL ADVANCE DIRECTIVE - TREATMENT GUIDELINE COMMENT
Pleasure feeds, decide use of antibiotics at time of infection, no feeding tube.  Willing to meet with hospice team.

## 2019-02-19 NOTE — CONSULT NOTE ADULT - PROBLEM SELECTOR RECOMMENDATION 3
As per family members patient is more awake today.   Pt is AAOx1 As per family members patient is more awake today.   Pt is AAOx1  Can consider low dose haldol or xyprexa PRN.

## 2019-02-19 NOTE — PROGRESS NOTE ADULT - SUBJECTIVE AND OBJECTIVE BOX
Patient is a 89y old  Female who presents with a chief complaint of le edema/failure to thrive (19 Feb 2019 14:11)                                                               INTERVAL HPI/OVERNIGHT EVENTS:    REVIEW OF SYSTEMS:     CONSTITUTIONAL: No weakness, fevers or chills  EYES/ENT: No visual changes , no ear ache   NECK: No pain or stiffness  RESPIRATORY: No cough, wheezing,  No shortness of breath  CARDIOVASCULAR: No chest pain or palpitations  GASTROINTESTINAL: No abdominal pain  . No nausea, vomiting, or hematemesis; No diarrhea or constipation. No melena or hematochezia.  GENITOURINARY: No dysuria, frequency or hematuria  NEUROLOGICAL: No numbness or weakness  SKIN: No itching, burning, rashes, or lesions                                                                                                                                                                                                                                                                                 Medications:  MEDICATIONS  (STANDING):  ALBUTerol/ipratropium for Nebulization 3 milliLiter(s) Nebulizer every 6 hours  dextrose 5% + sodium chloride 0.45%. 1000 milliLiter(s) (80 mL/Hr) IV Continuous <Continuous>  heparin  Injectable 5000 Unit(s) SubCutaneous every 12 hours  levothyroxine 50 MICROGram(s) Oral daily  nystatin    Suspension 526721 Unit(s) Oral four times a day  sucralfate suspension 1 Gram(s) Oral four times a day    MEDICATIONS  (PRN):  senna 2 Tablet(s) Oral at bedtime PRN Constipation       Allergies    No Known Drug Allergies  shellfish (Anaphylaxis)    Intolerances      Vital Signs Last 24 Hrs  T(C): 36.3 (19 Feb 2019 11:55), Max: 36.9 (19 Feb 2019 05:03)  T(F): 97.3 (19 Feb 2019 11:55), Max: 98.4 (19 Feb 2019 05:03)  HR: 109 (19 Feb 2019 11:55) (93 - 109)  BP: 104/62 (19 Feb 2019 11:55) (104/62 - 144/78)  BP(mean): --  RR: 18 (19 Feb 2019 11:55) (18 - 18)  SpO2: 96% (19 Feb 2019 11:55) (96% - 98%)  CAPILLARY BLOOD GLUCOSE          02-18 @ 07:01  -  02-19 @ 07:00  --------------------------------------------------------  IN: 1590 mL / OUT: 0 mL / NET: 1590 mL      Physical Exam:    Daily     Daily   General:  Well appearing, NAD, not cachetic  HEENT:  Nonicteric, PERRLA  CV:  RRR, S1S2   Lungs:  CTA B/L, no wheezes, rales, rhonchi  Abdomen:  Soft, non-tender, no distended, positive BS  Extremities:  2+ pulses, no c/c, no edema  Skin:  Warm and dry, no rashes  :  No mendez  Neuro:  AAOx3, non-focal, grossly intact                                                                                                                                                                                                                                                                                                LABS:                               11.7   8.53  )-----------( 190      ( 19 Feb 2019 08:34 )             37.5                      02-19    147<H>  |  111<H>  |  30<H>  ----------------------------<  141<H>  3.9   |  23  |  1.38<H>    Ca    9.5      19 Feb 2019 07:11  Phos  3.9     02-19  Mg     1.7     02-19                         RADIOLOGY & ADDITIONAL TESTS         I personally reviewed: [  ]EKG   [  ]CXR    [  ] CT      A/P:         Discussed with :     Emeka consultants' Notes   Time spent : Patient is a 89y old  Female who presents with a chief complaint of le edema/failure to thrive (19 Feb 2019 14:11)                                                               INTERVAL HPI/OVERNIGHT EVENTS:    REVIEW OF SYSTEMS:     more alert  denies pain at this time                                                                                                                                                                                                                                                                               Medications:  MEDICATIONS  (STANDING):  ALBUTerol/ipratropium for Nebulization 3 milliLiter(s) Nebulizer every 6 hours  dextrose 5% + sodium chloride 0.45%. 1000 milliLiter(s) (80 mL/Hr) IV Continuous <Continuous>  heparin  Injectable 5000 Unit(s) SubCutaneous every 12 hours  levothyroxine 50 MICROGram(s) Oral daily  nystatin    Suspension 681998 Unit(s) Oral four times a day  sucralfate suspension 1 Gram(s) Oral four times a day    MEDICATIONS  (PRN):  senna 2 Tablet(s) Oral at bedtime PRN Constipation       Allergies    No Known Drug Allergies  shellfish (Anaphylaxis)    Intolerances      Vital Signs Last 24 Hrs  T(C): 36.3 (19 Feb 2019 11:55), Max: 36.9 (19 Feb 2019 05:03)  T(F): 97.3 (19 Feb 2019 11:55), Max: 98.4 (19 Feb 2019 05:03)  HR: 109 (19 Feb 2019 11:55) (93 - 109)  BP: 104/62 (19 Feb 2019 11:55) (104/62 - 144/78)  BP(mean): --  RR: 18 (19 Feb 2019 11:55) (18 - 18)  SpO2: 96% (19 Feb 2019 11:55) (96% - 98%)  CAPILLARY BLOOD GLUCOSE          02-18 @ 07:01  -  02-19 @ 07:00  --------------------------------------------------------  IN: 1590 mL / OUT: 0 mL / NET: 1590 mL      Physical Exam:    General:  NAD  cachectic   HEENT:  Nonicteric, PERRLA  CV:  RRR, S1S2   Lungs:  poor effort otherwise CTAB  Abdomen:  Soft, non-tender, no distended, positive BS  Extremities:   no edema  Neuro:  Alert   cachectic and limited exam  pt is not cooperative                                                                                                                                                                                                                                                                                         LABS:                               11.7   8.53  )-----------( 190      ( 19 Feb 2019 08:34 )             37.5                      02-19    147<H>  |  111<H>  |  30<H>  ----------------------------<  141<H>  3.9   |  23  |  1.38<H>    Ca    9.5      19 Feb 2019 07:11  Phos  3.9     02-19  Mg     1.7     02-19

## 2019-02-19 NOTE — PROGRESS NOTE ADULT - SUBJECTIVE AND OBJECTIVE BOX
PRISCILLA RICHARDSON:[id number],   89yFemale followed for:  No Known Drug Allergies  shellfish (Anaphylaxis)    PAST MEDICAL & SURGICAL HISTORY:  Stomach cancer: 2003  Kidney stone  Hypothyroid  History of ovarian cyst: 2009    FAMILY HISTORY:    MEDICATIONS  (STANDING):  ALBUTerol/ipratropium for Nebulization 3 milliLiter(s) Nebulizer every 6 hours  dextrose 5% + sodium chloride 0.45%. 1000 milliLiter(s) (50 mL/Hr) IV Continuous <Continuous>  dextrose 5% + sodium chloride 0.9%. 1000 milliLiter(s) (60 mL/Hr) IV Continuous <Continuous>  heparin  Injectable 5000 Unit(s) SubCutaneous every 12 hours  levothyroxine 50 MICROGram(s) Oral daily  nystatin    Suspension [id number] Unit(s) Oral four times a day  pantoprazole Infusion 8 mG/Hr (10 mL/Hr) IV Continuous <Continuous>  sucralfate suspension 1 Gram(s) Oral four times a day    MEDICATIONS  (PRN):  senna 2 Tablet(s) Oral at bedtime PRN Constipation      Vital Signs Last 24 Hrs  T(C): 36.3 (19 Feb 2019 09:33), Max: 37.1 (18 Feb 2019 11:15)  T(F): 97.3 (19 Feb 2019 09:33), Max: 98.8 (18 Feb 2019 11:15)  HR: 93 (19 Feb 2019 09:33) (93 - 107)  BP: 144/78 (19 Feb 2019 09:33) (111/72 - 145/52)  BP(mean): --  RR: 18 (19 Feb 2019 09:33) (18 - 18)  SpO2: 98% (19 Feb 2019 09:33) (94% - 98%)  nc/at  s1s2  cta  soft, nt, nd no guarding or rebound  no c/c/e    CBC Full  -  ( 19 Feb 2019 08:34 )  WBC Count : 8.53 K/uL  Hemoglobin : 11.7 g/dL  Hematocrit : 37.5 %  Platelet Count - Automated : 190 K/uL  Mean Cell Volume : 98.4 fl  Mean Cell Hemoglobin : 30.7 pg  Mean Cell Hemoglobin Concentration : 31.2 gm/dL  Auto Neutrophil # : x  Auto Lymphocyte # : x  Auto Monocyte # : x  Auto Eosinophil # : x  Auto Basophil # : x  Auto Neutrophil % : x  Auto Lymphocyte % : x  Auto Monocyte % : x  Auto Eosinophil % : x  Auto Basophil % : x    02-19    147<H>  |  111<H>  |  30<H>  ----------------------------<  141<H>  3.9   |  23  |  1.38<H>    Ca    9.5      19 Feb 2019 07:11  Phos  3.9     02-19  Mg     1.7     02-19

## 2019-02-19 NOTE — CONSULT NOTE ADULT - REASON FOR ADMISSION
le edema/failure to thrive

## 2019-02-19 NOTE — CONSULT NOTE ADULT - ATTENDING COMMENTS
I have personally seen and examined this patient and agree with the above assessment and plan, which I have reviewed and edited where appropriate.   Extensive discussion held with family regarding GOC in this frail elderly female.  ? early dementia.  Definitely FTT.  Work up in progress.  MOLST form completed with appropriate witnesses, signatures and capacity determination.  Hospice referral made.  Patient to be discharged from the [facility] team consult service today.  Please call [phone] if we can be of further assistance.     45      minutes for advanced care planning discussion separate and in addition to the e and m service provided.

## 2019-02-19 NOTE — CONSULT NOTE ADULT - PROBLEM SELECTOR RECOMMENDATION 2
Tortuous esophagus with hang-up of contrast in the mid to distal   esophagus.  Currently on Dysphagia 1 diet,   Not a candidate for PEG tube Tortuous esophagus with hang-up of contrast in the mid to distal   esophagus.  Currently on Dysphagia 1 diet,   Not a candidate for PEG tube;  Family members do not want any kind of feeding tube. Tortuous esophagus with hang-up of contrast in the mid to distal   esophagus.  Currently on Dysphagia 1 diet,   Not a candidate for PEG tube;  Family members do not want any kind of feeding tube.  Agree to pleasure feeds.

## 2019-02-19 NOTE — PROGRESS NOTE ADULT - ASSESSMENT
Mental status improved. CPK trending down.  No rigidity of arms and  is afebrile, so less likely neuroleptic malignant syndrome.   She is hypotensive and tachycardic. ? from infection?    Recommend  Follow CPK, vital signs  No neuroleptics  Following.    Armando Blanca M.D.  Psychiatry  [phone]

## 2019-02-19 NOTE — CONSULT NOTE ADULT - PROBLEM SELECTOR RECOMMENDATION 9
No further bleeding while in the hospital,   pathology w/o Malignancy  H&H has been stable,   On Sucralfate  Management was per GI and PRimary team

## 2019-02-19 NOTE — CONSULT NOTE ADULT - SUBJECTIVE AND OBJECTIVE BOX
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  Pt has been disoriented for the last 2 wks, yales and screams at night, she started complaining difficulty swallowing and spitting out salivas. Also, Family notices blood in toilet (not sure from urine or stool), which rushes her to the ED. Pt lost more than 10lbs over the last month.  On Feb 15 EGD was done, Pathology shows no malignancy with changes consistent with chronic inflammation at the esophagus. 02/15 Barium Swallow showed Tortuous esophagus with hang-up of contrast in the mid to distal   esophagus. As per GI patient is not a candidate for PEG tube due to several stomach surgeries in the past.  As per Son Hector, she declined even further after the Barium Swallow at a point that was AAOx0; However, today he thinks that she is doing a lot better. She is awake and talking with family, but disoriented.   Before she started declining at home 3 weeks agom, patient was able to walk with a walker and to eat by herself. She was on a puree soft diet.     PAST MEDICAL & SURGICAL HISTORY:  Stomach cancer: 2003  Kidney stone  Hypothyroid  History of ovarian cyst: 2009      MEDICATIONS  (STANDING):  ALBUTerol/ipratropium for Nebulization 3 milliLiter(s) Nebulizer every 6 hours  dextrose 5% + sodium chloride 0.45%. 1000 milliLiter(s) (50 mL/Hr) IV Continuous <Continuous>  dextrose 5% + sodium chloride 0.9%. 1000 milliLiter(s) (60 mL/Hr) IV Continuous <Continuous>  heparin  Injectable 5000 Unit(s) SubCutaneous every 12 hours  levothyroxine 50 MICROGram(s) Oral daily  nystatin    Suspension 101997 Unit(s) Oral four times a day  pantoprazole Infusion 8 mG/Hr (10 mL/Hr) IV Continuous <Continuous>  sucralfate suspension 1 Gram(s) Oral four times a day    MEDICATIONS  (PRN):  senna 2 Tablet(s) Oral at bedtime PRN Constipation      FAMILY HISTORY:      SOCIAL HISTORY:    [ ] Non-smoker  [ ] Smoker  [ ] Alcohol    Allergies    No Known Drug Allergies  shellfish (Anaphylaxis)      RADIOLOGY:  Esophageogram:  IMPRESSION:   Tortuous esophagus with hang-up of contrast in the mid to distal   esophagus.    Study terminated prior to assessment of barium pill due to aspiration.    OTHER:   EGD pathology    Final Diagnosis  1. GE junction, biopsy:  - Gastric mucosa with no significant histopathologic  findings.    2. Esophagus, biopsy:  - Squamous mucosa with chronic inflammation and rare  intraepithelial eosinophil, see note.	  	  LABS:	 	    CARDIAC MARKERS:                              12.4   7.8   )-----------( 210      ( 14 Feb 2019 03:31 )             38.0     02-14    145  |  104  |  19  ----------------------------<  109<H>  3.9   |  28  |  0.79    Ca    8.8      14 Feb 2019 03:31  Phos  2.9     02-14  Mg     1.8     02-14    TPro  6.8  /  Alb  3.3  /  TBili  0.4  /  DBili  x   /  AST  53<H>  /  ALT  32  /  AlkPhos  324<H>  02-14    proBNP: Serum Pro-Brain Natriuretic Peptide: 2551 pg/mL (02-14 @ 03:31)      PREVIOUS DIAGNOSTIC TESTING:    < from: CT Head No Cont (09.28.16 @ 07:20) >  IMPRESSION: No CT evidence of acute intracranial hemorrhage, brain edema,   mass effect or acute territorial infarct. Soft tissue swelling of the   left parietal region. The underlying calvarium is intact.    < end of copied text > (14 Feb 2019 07:40)      PERTINENT PMH REVIEWED:  [x ] YES [ ] NO           SOCIAL HISTORY:   Significant other/partner:  [x ] YES  [ ] NO               Children:  [ x] YES  [ ] NO                   Mandaen/Spirituality: Anglican  Substance hx:  [ ] YES   [ ] NO                   Tobacco hx:  [ ] YES  [ ] NO                       Alcohol hx: [ ] YES  [ ] NO         Home Opioid hx:  [ ] YES  [ ] NO   Living Situation: [x ] Home  [ ] Long term care  [ ] Rehab [ ] Other    FAMILY HISTORY:  [x ] Family history non-contributory     BASELINE (I)ADLs (prior to admission):  [place]: [ ] total  [ ] moderate [ ] dependent    ADVANCE DIRECTIVES:    DNR [x ] YES [ ] NO                            [ ] Completed  MOLST  [ ] YES [ ] NO                      [ ] Completed  Health Care Proxy [ ] YES  [ ] NO   [ ] Completed  Living Will  [ ] YES [ ] NO             [ x] Surrogate  [ ] HCP  [ ] Guardian:    -Hector Argueta () [phone]  -Hector Argueta (Son) [phone]    PRESENT SYMPTOMS:  Source: [ ] Patient   [x ] Family   [ ] Team     Pain:                        [ x] No [ ] Yes             [ ] Mild [ ] Moderate [ ] Severe    Onset -  Location -  Duration -  Character -  Alleviating/Aggravating -  Radiation -  Timing -      Dyspnea:                [x ] No [ ] Yes             [ ] Mild [ ] Moderate [ ] Severe    Anxiety:                  [x ] No [ ] Yes             [ ] Mild [ ] Moderate [ ] Severe    Fatigue:                  [ ] No [x ] Yes             [ ] Mild [ ] Moderate [x ] Severe    Nausea:                  [x ] No [ ] Yes             [ ] Mild [ ] Moderate [ ] Severe    Loss of appetite:   [ ] No [x ] Yes             [ ] Mild [ ] Moderate [ x] Severe    Constipation:        [ x] No [ ] Yes             [ ] Mild [ ] Moderate [ ] Severe    Other Symptoms:  [ ] All other review of systems negative   [x] Unable to obtain due to poor mentation     Karnofsky Performance Score/Palliative Performance Status Version 2:     30   %    PHYSICAL EXAM:  Vital Signs Last 24 Hrs  T(C): 36.3 (19 Feb 2019 09:33), Max: 37.1 (18 Feb 2019 11:15)  T(F): 97.3 (19 Feb 2019 09:33), Max: 98.8 (18 Feb 2019 11:15)  HR: 93 (19 Feb 2019 09:33) (93 - 107)  BP: 144/78 (19 Feb 2019 09:33) (111/72 - 145/52)  BP(mean): --  RR: 18 (19 Feb 2019 09:33) (18 - 18)  SpO2: 98% (19 Feb 2019 09:33) (94% - 98%) I&O's Summary    18 Feb 2019 07:01  -  19 Feb 2019 07:00  --------------------------------------------------------  IN: 1590 mL / OUT: 0 mL / NET: 1590 mL        General:  [ ] Alert  [ ] Oriented x      [ ] Lethargic  [ ] Agitated   [ ]x Cachexia   [ ] Unarousable  [x ] Verbal  [ ] Non-Verbal    HEENT:  [ ] Normal   [x ] Dry mouth   [ ] ET Tube    [ ] Trach  [ ] Oral lesions    Lungs:   [x ] Clear [ ] Tachypnea  [ ] Audible excessive secretions   [ ] Rhonchi        [ ] Right [ ] Left [ ] Bilateral  [ ] Crackles        [ ] Right [ ] Left [ ] Bilateral  [ ] Wheezing     [ ] Right [ ] Left [ ] Bilateral    Cardiovascular:  [ x] Regular [ ] Irregular [ ] Tachycardia   [ ] Bradycardia  [ ] Murmur [ ] Other    Abdomen: [x ] hard   [ ] Distended   [x ] +BS  [x ] Non tender [ ] Tender  [ ]PEG   [ ]OGT/ NGT   Last BM:  2/19/2019    Genitourinary: [ ] Normal [ ] Incontinent   [ ] Oliguria/Anuria   [ ] Duron    Musculoskeletal:  [ ] Normal   [x ] Weakness  [ ] Bedbound/Wheelchair bound [ ] Edema    Neurological: [ x] No focal deficits  [x ] Cognitive impairment  [ ] Dysphagia [ ] Dysarthria [ ] Paresis [ ] Other     Skin: [ ] Normal   [ ] Pressure ulcer(s)                  [ ] Rash    LABS:  [ ] Critical Care time for unstable patient with organ failure.  Total Time:                 minutes  02-19    147<H>  |  111<H>  |  30<H>  ----------------------------<  141<H>  3.9   |  23  |  1.38<H>    Ca    9.5      19 Feb 2019 07:11  Phos  3.9     02-19  Mg     1.7     02-19    Protein Calorie Malnutrition: [ ] Mild [ ] Moderate [x ] Severe  Oral Intake: [ ] Unable/mouth care only [ ] Minimal [ ] Moderate [ ] Full Capability  Diet: [ ] NPO [ ] Tube feeds [ ] TPN [ ] Other : Dysphagia 1 diet        REFERRALS:   [ ] Chaplaincy  [ ] Hospice  [ ] Child Life  [ ] Social Work  [ ] Case management [ ] Holistic Therapy HPI:  CHIEF COMPLAINT:Patient is a 89y old  Female who presents with a chief complaint of failure to thrive/le edema    HPI:  89 yo F with PMH of gastric cancer, s/p gastrectomy + radiology (14 yrs ago), 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  Pt has been disoriented for the last 2 wks, yales and screams at night, she started complaining difficulty swallowing and spitting out salivas. Also, Family notices blood in toilet (not sure from urine or stool), which rushes her to the ED. Pt lost more than 10lbs over the last month.  On Feb 15 EGD was done, Pathology shows no malignancy with changes consistent with chronic inflammation at the esophagus. 02/15 Barium Swallow showed Tortuous esophagus with hang-up of contrast in the mid to distal   esophagus. As per GI patient is not a candidate for PEG tube due to several stomach surgeries in the past.  As per Son Hector, she declined even further after the Barium Swallow at a point that was AAOx0; However, today he thinks that she is doing a lot better. She sleepy but easily arousable, able to follow simple commands, but disoriented.   Before she started declining at home 3 weeks agom, patient was able to walk with a walker and to eat by herself. She was on a puree soft diet.     PAST MEDICAL & SURGICAL HISTORY:  Stomach cancer: 2003  Kidney stone  Hypothyroid  History of ovarian cyst: 2009      MEDICATIONS  (STANDING):  ALBUTerol/ipratropium for Nebulization 3 milliLiter(s) Nebulizer every 6 hours  dextrose 5% + sodium chloride 0.45%. 1000 milliLiter(s) (50 mL/Hr) IV Continuous <Continuous>  dextrose 5% + sodium chloride 0.9%. 1000 milliLiter(s) (60 mL/Hr) IV Continuous <Continuous>  heparin  Injectable 5000 Unit(s) SubCutaneous every 12 hours  levothyroxine 50 MICROGram(s) Oral daily  nystatin    Suspension 874882 Unit(s) Oral four times a day  pantoprazole Infusion 8 mG/Hr (10 mL/Hr) IV Continuous <Continuous>  sucralfate suspension 1 Gram(s) Oral four times a day    MEDICATIONS  (PRN):  senna 2 Tablet(s) Oral at bedtime PRN Constipation      FAMILY HISTORY:      SOCIAL HISTORY:    [ ] Non-smoker  [ ] Smoker  [ ] Alcohol    Allergies    No Known Drug Allergies  shellfish (Anaphylaxis)      RADIOLOGY:  Esophageogram:  IMPRESSION:   Tortuous esophagus with hang-up of contrast in the mid to distal   esophagus.    Study terminated prior to assessment of barium pill due to aspiration.    OTHER:   EGD pathology    Final Diagnosis  1. GE junction, biopsy:  - Gastric mucosa with no significant histopathologic  findings.    2. Esophagus, biopsy:  - Squamous mucosa with chronic inflammation and rare  intraepithelial eosinophil, see note.	  	  LABS:	 	    CARDIAC MARKERS:                              12.4   7.8   )-----------( 210      ( 14 Feb 2019 03:31 )             38.0     02-14    145  |  104  |  19  ----------------------------<  109<H>  3.9   |  28  |  0.79    Ca    8.8      14 Feb 2019 03:31  Phos  2.9     02-14  Mg     1.8     02-14    TPro  6.8  /  Alb  3.3  /  TBili  0.4  /  DBili  x   /  AST  53<H>  /  ALT  32  /  AlkPhos  324<H>  02-14    proBNP: Serum Pro-Brain Natriuretic Peptide: 2551 pg/mL (02-14 @ 03:31)      PREVIOUS DIAGNOSTIC TESTING:    < from: CT Head No Cont (09.28.16 @ 07:20) >  IMPRESSION: No CT evidence of acute intracranial hemorrhage, brain edema,   mass effect or acute territorial infarct. Soft tissue swelling of the   left parietal region. The underlying calvarium is intact.    < end of copied text > (14 Feb 2019 07:40)      PERTINENT PMH REVIEWED:  [x ] YES [ ] NO           SOCIAL HISTORY:   Significant other/partner:  [x ] YES  [ ] NO               Children:  [ x] YES  [ ] NO                   Synagogue/Spirituality: Mandaeism  Substance hx:  [ ] YES   [ ] NO                   Tobacco hx:  [ ] YES  [ ] NO                       Alcohol hx: [ ] YES  [ ] NO         Home Opioid hx:  [ ] YES  [ ] NO   Living Situation: [x ] Home  [ ] Long term care  [ ] Rehab [ ] Other    FAMILY HISTORY:  [x ] Family history non-contributory     BASELINE (I)ADLs (prior to admission):  [place]: [ ] total  [ ] moderate [ ] dependent    ADVANCE DIRECTIVES:    DNR [x ] YES [ ] NO                            [ ] Completed  MOLST  [ ] YES [ ] NO                      [ ] Completed  Health Care Proxy [ ] YES  [ ] NO   [ ] Completed  Living Will  [ ] YES [ ] NO             [ x] Surrogate  [ ] HCP  [ ] Guardian:    -Hector Argueta () [phone]  -Hector Argueta (Son) [phone]    PRESENT SYMPTOMS:  Source: [ ] Patient   [x ] Family   [ ] Team     Pain:                        [ x] No [ ] Yes             [ ] Mild [ ] Moderate [ ] Severe    Onset -  Location -  Duration -  Character -  Alleviating/Aggravating -  Radiation -  Timing -      Dyspnea:                [x ] No [ ] Yes             [ ] Mild [ ] Moderate [ ] Severe    Anxiety:                  [x ] No [ ] Yes             [ ] Mild [ ] Moderate [ ] Severe    Fatigue:                  [ ] No [x ] Yes             [ ] Mild [ ] Moderate [x ] Severe    Nausea:                  [x ] No [ ] Yes             [ ] Mild [ ] Moderate [ ] Severe    Loss of appetite:   [ ] No [x ] Yes             [ ] Mild [ ] Moderate [ x] Severe    Constipation:        [ x] No [ ] Yes             [ ] Mild [ ] Moderate [ ] Severe    Other Symptoms:  [ ] All other review of systems negative   [x] Unable to obtain due to poor mentation     Karnofsky Performance Score/Palliative Performance Status Version 2:     30   %    PHYSICAL EXAM:  Vital Signs Last 24 Hrs  T(C): 36.3 (19 Feb 2019 09:33), Max: 37.1 (18 Feb 2019 11:15)  T(F): 97.3 (19 Feb 2019 09:33), Max: 98.8 (18 Feb 2019 11:15)  HR: 93 (19 Feb 2019 09:33) (93 - 107)  BP: 144/78 (19 Feb 2019 09:33) (111/72 - 145/52)  BP(mean): --  RR: 18 (19 Feb 2019 09:33) (18 - 18)  SpO2: 98% (19 Feb 2019 09:33) (94% - 98%) I&O's Summary    18 Feb 2019 07:01  -  19 Feb 2019 07:00  --------------------------------------------------------  IN: 1590 mL / OUT: 0 mL / NET: 1590 mL        General:  [ ] Alert  [ ] Oriented x      [x ] Lethargic but arousable  [ ] Agitated   [ ]x Cachexia   [ ] Unarousable  [x ] Verbal  [ ] Non-Verbal    HEENT:  [ ] Normal   [x ] Dry mouth   [ ] ET Tube    [ ] Trach  [ ] Oral lesions    Lungs:   [x ] Clear [ ] Tachypnea  [ ] Audible excessive secretions   [ ] Rhonchi        [ ] Right [ ] Left [ ] Bilateral  [ ] Crackles        [ ] Right [ ] Left [ ] Bilateral  [ ] Wheezing     [ ] Right [ ] Left [ ] Bilateral    Cardiovascular:  [ x] Regular [ ] Irregular [ ] Tachycardia   [ ] Bradycardia  [ ] Murmur [ ] Other    Abdomen: [x ] hard   [ ] Distended   [x ] +BS  [x ] Non tender [ ] Tender  [ ]PEG   [ ]OGT/ NGT   Last BM:  2/19/2019    Genitourinary: [ ] Normal [x ] Incontinent   [ ] Oliguria/Anuria   [ ] Duron    Musculoskeletal:  [ ] Normal   [x ] Weakness  [ ] Bedbound/Wheelchair bound [ ] Edema    Neurological: [ x] No focal deficits  [x ] Cognitive impairment  [ ] Dysphagia [ ] Dysarthria [ ] Paresis [ ] Other     Skin: [ ] Normal   [ x] Pressure ulcer(s)     stage II sacrum              [ ] Rash    LABS:  [ ] Critical Care time for unstable patient with organ failure.  Total Time:                 minutes  02-19    147<H>  |  111<H>  |  30<H>  ----------------------------<  141<H>  3.9   |  23  |  1.38<H>    Ca    9.5      19 Feb 2019 07:11  Phos  3.9     02-19  Mg     1.7     02-19                          11.7   8.53  )-----------( 190      ( 19 Feb 2019 08:34 )             37.5     Protein Calorie Malnutrition: [ ] Mild [ ] Moderate [x ] Severe  Oral Intake: [ ] Unable/mouth care only [ x] Minimal [ ] Moderate [ ] Full Capability  Diet: [ ] NPO [ ] Tube feeds [ ] TPN [ ] Other : Dysphagia 1 diet        REFERRALS:   [ ] Chaplaincy  [ ] Hospice  [ ] Child Life  [ ] Social Work  [ ] Case management [ ] Holistic Therapy

## 2019-02-19 NOTE — PROGRESS NOTE ADULT - ASSESSMENT
son concerned about "barrium toxitcity" reassured. continue rx.  speech and swallow. poor peg candidate. increase fluids

## 2019-02-19 NOTE — PROGRESS NOTE ADULT - ASSESSMENT
89 y/o female hx of gastric cancer and hypothyroid admitted with dysphagia/  acute GI bleed and weight loss     1- GI Bleed : monitor H/H   PPI IV   avoid NSAIDS   transfuse PRN     2- dysphagia : EGD: < from: Upper Endoscopy (02.14.19 @ 16:27) >     -very tortuous ues, ? exterinsic compression       -? monilial candidiasis       -ulcer at anastamosis, s/p gastric resection                                                                                                        Impression:          - rx candida, will get esopahgram. carafate  Recommendation:      - Await pathology results.    cont nystatin   esophagram noted: pt had an episode of aspiration prior tbe given the pill          3- hypothyroid: cont meds   acceptable range     4- Anemia : monitor for now     5- edema : likely thrid spacing however consdier VA duplex r/o DVT     6- Encephalopathy :   unclear etiology however will need to consider NMS and other etiologies as in occult infection /asp pna etc   will check rectal temp check CK and procal   check CT head  check Ct chest  psych input   avoid haldol     discussed with son and    and later with  and daughter   PAS 89 y/o female hx of gastric cancer and hypothyroid admitted with dysphagia/  acute GI bleed and weight loss     1- GI Bleed : no further episodes    H/H stable   change to po   avoid NSAIDS   transfuse PRN     2- dysphagia : EGD: < from: Upper Endoscopy (02.14.19 @ 16:27) >     -very tortuous ues, ? exterinsic compression       -? monilial candidiasis       -ulcer at anastamosis, s/p gastric resection                                                                                                        Impression:          - rx candida, will get esopahgram. carafate  Recommendation:      - Await pathology results.    cont nystatin   esophagram noted: pt had an episode of aspiration prior tbe given the pill          3- hypothyroid: cont meds   acceptable range     4- Anemia : monitor for now     5- edema : likely thrid spacing however consdier VA duplex r/o DVT     6- Encephalopathy :   unclear etiology however will need to consider NMS and other etiologies as in occult infection /asp pna etc   afeb  elevated CK ? sec to haldol    CT head negative    Ct chest : negatvie for pna   psych input apprecaited   avoid haldol     discussed with son and    d/w psych   d.w np   PAS

## 2019-02-19 NOTE — GOALS OF CARE CONVERSATION - PERSONAL ADVANCE DIRECTIVE - WHAT MATTERS MOST
What matters most to family is that the patient be comfortable.  Concerned about care at home as spouse is elderly and frail as well.

## 2019-02-19 NOTE — CONSULT NOTE ADULT - ASSESSMENT
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  Was last fully independent three years ago.

## 2019-02-19 NOTE — PROGRESS NOTE ADULT - SUBJECTIVE AND OBJECTIVE BOX
Events noted. Per RN and NP, no overnight agitation. No neuroleptics given last night nor today. Eats "a little" per son at bedside.       Vital Signs Last 24 Hrs  T(C): 36.3 (19 Feb 2019 11:55), Max: 36.9 (18 Feb 2019 16:15)  T(F): 97.3 (19 Feb 2019 11:55), Max: 98.4 (18 Feb 2019 16:15)  HR: 109 (19 Feb 2019 11:55) (93 - 109)  BP: 104/62 (19 Feb 2019 11:55) (104/62 - 145/52)  BP(mean): --  RR: 18 (19 Feb 2019 11:55) (18 - 18)  SpO2: 96% (19 Feb 2019 11:55) (94% - 98%)                          11.7   8.53  )-----------( 190      ( 19 Feb 2019 08:34 )             37.5       02-19    147<H>  |  111<H>  |  30<H>  ----------------------------<  141<H>  3.9   |  23  |  1.38<H>    Ca    9.5      19 Feb 2019 07:11  Phos  3.9     02-19  Mg     1.7     02-19        Creatine Kinase, Serum: 1109 U/L (02.19.19 @ 07:11)            MEDICATIONS  (STANDING):  ALBUTerol/ipratropium for Nebulization 3 milliLiter(s) Nebulizer every 6 hours  dextrose 5% + sodium chloride 0.45%. 1000 milliLiter(s) (80 mL/Hr) IV Continuous <Continuous>  heparin  Injectable 5000 Unit(s) SubCutaneous every 12 hours  levothyroxine 50 MICROGram(s) Oral daily  nystatin    Suspension 888517 Unit(s) Oral four times a day  sucralfate suspension 1 Gram(s) Oral four times a day    MEDICATIONS  (PRN):  senna 2 Tablet(s) Oral at bedtime PRN Constipation      Elderly WF in bed, eyes closed.  calm, cooperative, alert and oriented x 2-3 ("[facility]"). No cogwheel nor leadpipe rigidity.   No tremors nor diaphoresis.  Insight and judgment are impaired. Speech is coherent with normal rate and low volume. No hallucinations nor delusions. The patient denied suicidal and homicidal ideation and plan. Mood is euthymic and affect flat. Impaired Attention and concentration, short term memory, and long term memory.

## 2019-02-19 NOTE — PROGRESS NOTE ADULT - SUBJECTIVE AND OBJECTIVE BOX
CARDIOLOGY     PROGRESS  NOTE   ________________________________________________    CHIEF COMPLAINT:Patient is a 89y old  Female who presents with a chief complaint of le edema/failure to thrive (18 Feb 2019 17:03)  confuse.  	  REVIEW OF SYSTEMS:  CONSTITUTIONAL: No fever, weight loss, or fatigue  EYES: No eye pain, visual disturbances, or discharge  ENT:  No difficulty hearing, tinnitus, vertigo; No sinus or throat pain  NECK: No pain or stiffness  RESPIRATORY: No cough, wheezing, chills or hemoptysis; No Shortness of Breath  CARDIOVASCULAR: No chest pain, palpitations, passing out, dizziness, or leg swelling  GASTROINTESTINAL: No abdominal or epigastric pain. No nausea, vomiting, or hematemesis; No diarrhea or constipation. No melena or hematochezia.  GENITOURINARY: No dysuria, frequency, hematuria, or incontinence  NEUROLOGICAL: No headaches, memory loss, loss of strength, numbness, or tremors  SKIN: No itching, burning, rashes, or lesions   LYMPH Nodes: No enlarged glands  ENDOCRINE: No heat or cold intolerance; No hair loss  MUSCULOSKELETAL: No joint pain or swelling; No muscle, back, or extremity pain  PSYCHIATRIC: No depression, anxiety, mood swings, or difficulty sleeping  HEME/LYMPH: No easy bruising, or bleeding gums  ALLERGY AND IMMUNOLOGIC: No hives or eczema	    [ ] All others negative	  [ ] Unable to obtain    PHYSICAL EXAM:  T(C): 36.9 (02-19-19 @ 05:03), Max: 37.1 (02-18-19 @ 11:15)  HR: 99 (02-19-19 @ 05:03) (96 - 107)  BP: 129/68 (02-19-19 @ 05:03) (111/72 - 145/52)  RR: 18 (02-19-19 @ 05:03) (18 - 20)  SpO2: 97% (02-19-19 @ 05:03) (94% - 97%)  Wt(kg): --  I&O's Summary    18 Feb 2019 07:01  -  19 Feb 2019 07:00  --------------------------------------------------------  IN: 1590 mL / OUT: 0 mL / NET: 1590 mL        Appearance. cachectic  HEENT:   Normal oral mucosa, PERRL, EOMI	  Lymphatic: No lymphadenopathy  Cardiovascular: Normal S1 S2, No JVD, + murmurs, No edema  Respiratory: Lungs clear to auscultation	  Psychiatry: A & O x 3, Mood & affect appropriate  Gastrointestinal:  Soft, Non-tender, + BS	  Skin: No rashes, No ecchymoses, No cyanosis	  Neurologic: Non-focal  Extremities: Normal range of motion, No clubbing, cyanosis or edema  Vascular: Peripheral pulses palpable 2+ bilaterally    MEDICATIONS  (STANDING):  ALBUTerol/ipratropium for Nebulization 3 milliLiter(s) Nebulizer every 6 hours  dextrose 5% + sodium chloride 0.45%. 1000 milliLiter(s) (50 mL/Hr) IV Continuous <Continuous>  dextrose 5% + sodium chloride 0.9%. 1000 milliLiter(s) (60 mL/Hr) IV Continuous <Continuous>  heparin  Injectable 5000 Unit(s) SubCutaneous every 12 hours  levothyroxine 50 MICROGram(s) Oral daily  nystatin    Suspension 233221 Unit(s) Oral four times a day  pantoprazole Infusion 8 mG/Hr (10 mL/Hr) IV Continuous <Continuous>  sucralfate suspension 1 Gram(s) Oral four times a day      TELEMETRY: 	    ECG:  	  RADIOLOGY:  OTHER: 	  	  LABS:	 	    CARDIAC MARKERS:  CARDIAC MARKERS ( 18 Feb 2019 10:19 )  x     / x     / 1415 U/L / x     / x                                    11.9   8.73  )-----------( 237      ( 18 Feb 2019 11:19 )             38.5     02-18    145  |  106  |  28<H>  ----------------------------<  89  4.7   |  21<L>  |  1.28    Ca    9.5      18 Feb 2019 10:19      proBNP: Serum Pro-Brain Natriuretic Peptide: 2551 pg/mL (02-14 @ 03:31)    Lipid Profile:   HgA1c:   TSH: Thyroid Stimulating Hormone, Serum: 5.70 uIU/mL (02-14 @ 05:28)    < from: Transthoracic Echocardiogram w/ Doppler (01.06.09 @ 16:44) >  1. Mitral annular calcification. Mitral valve prolapse  involving the posterior mitral leaflet.  2. Calcified trileaflet aortic valve with normal opening.  3. Severe left atrial enlargement.  4. Normal left ventricular internal dimensions and wall  thickness.  5. Normal left ventricular systolic function. EF 66%.  6. Normal right atrium.  7. Normal right ventricular size andsystolic function.  8. Mild-moderate mitral regurgitation.  9. Mild-moderate aortic regurgitation.  10. Mild-moderate tricuspid regurgitation. Estimated  pulmonary artery systolic pressure equals 40 mm Hg,  assuming right atrial pressure equals 10  mm Hg, consistent  with mild pulmonary hypertension.    < end of copied text >        Assessment and plan  ---------------------------  events noted  discussed with psychiatry  pt severely malnourished will discuss with gi ,pt has a mercedez very low calorie intake  echo  ?palliative care

## 2019-02-20 LAB
ANION GAP SERPL CALC-SCNC: 12 MMOL/L — SIGNIFICANT CHANGE UP (ref 5–17)
BUN SERPL-MCNC: 25 MG/DL — HIGH (ref 7–23)
CALCIUM SERPL-MCNC: 9.2 MG/DL — SIGNIFICANT CHANGE UP (ref 8.4–10.5)
CHLORIDE SERPL-SCNC: 109 MMOL/L — HIGH (ref 96–108)
CK SERPL-CCNC: 460 U/L — HIGH (ref 25–170)
CO2 SERPL-SCNC: 24 MMOL/L — SIGNIFICANT CHANGE UP (ref 22–31)
CREAT SERPL-MCNC: 1.16 MG/DL — SIGNIFICANT CHANGE UP (ref 0.5–1.3)
GLUCOSE SERPL-MCNC: 147 MG/DL — HIGH (ref 70–99)
POTASSIUM SERPL-MCNC: 3.7 MMOL/L — SIGNIFICANT CHANGE UP (ref 3.5–5.3)
POTASSIUM SERPL-SCNC: 3.7 MMOL/L — SIGNIFICANT CHANGE UP (ref 3.5–5.3)
SODIUM SERPL-SCNC: 145 MMOL/L — SIGNIFICANT CHANGE UP (ref 135–145)
VIT B12 SERPL-MCNC: 867 PG/ML — SIGNIFICANT CHANGE UP (ref 232–1245)

## 2019-02-20 RX ADMIN — Medication 3 MILLILITER(S): at 19:01

## 2019-02-20 RX ADMIN — Medication 500000 UNIT(S): at 11:56

## 2019-02-20 RX ADMIN — Medication 500000 UNIT(S): at 23:32

## 2019-02-20 RX ADMIN — PANTOPRAZOLE SODIUM 40 MILLIGRAM(S): 20 TABLET, DELAYED RELEASE ORAL at 19:02

## 2019-02-20 RX ADMIN — HEPARIN SODIUM 5000 UNIT(S): 5000 INJECTION INTRAVENOUS; SUBCUTANEOUS at 05:23

## 2019-02-20 RX ADMIN — Medication 1 GRAM(S): at 23:32

## 2019-02-20 RX ADMIN — PANTOPRAZOLE SODIUM 40 MILLIGRAM(S): 20 TABLET, DELAYED RELEASE ORAL at 05:23

## 2019-02-20 RX ADMIN — Medication 1 GRAM(S): at 19:02

## 2019-02-20 RX ADMIN — Medication 3 MILLILITER(S): at 05:23

## 2019-02-20 RX ADMIN — Medication 3 MILLILITER(S): at 23:32

## 2019-02-20 RX ADMIN — Medication 500000 UNIT(S): at 19:01

## 2019-02-20 RX ADMIN — HEPARIN SODIUM 5000 UNIT(S): 5000 INJECTION INTRAVENOUS; SUBCUTANEOUS at 19:01

## 2019-02-20 RX ADMIN — SODIUM CHLORIDE 80 MILLILITER(S): 9 INJECTION, SOLUTION INTRAVENOUS at 11:56

## 2019-02-20 RX ADMIN — Medication 500000 UNIT(S): at 05:23

## 2019-02-20 RX ADMIN — Medication 1 GRAM(S): at 05:22

## 2019-02-20 RX ADMIN — Medication 3 MILLILITER(S): at 11:57

## 2019-02-20 RX ADMIN — Medication 1 GRAM(S): at 11:57

## 2019-02-20 RX ADMIN — SODIUM CHLORIDE 80 MILLILITER(S): 9 INJECTION, SOLUTION INTRAVENOUS at 23:34

## 2019-02-20 RX ADMIN — Medication 50 MICROGRAM(S): at 05:22

## 2019-02-20 NOTE — PROGRESS NOTE ADULT - ASSESSMENT
Delirium resolving (?resolved). Suspect baseline mild dementia.     Recommend  No Haldol/Seroquel/neuroleptics  Encourage p.o. intake.  Following.    Armando Blanca M.D.  Psychiatry  [phone]

## 2019-02-20 NOTE — PROGRESS NOTE ADULT - SUBJECTIVE AND OBJECTIVE BOX
Patient is a 89y old  Female who presents with a chief complaint of le edema/failure to thrive (20 Feb 2019 10:57)                                                               INTERVAL HPI/OVERNIGHT EVENTS:    REVIEW OF SYSTEMS:     alert and eating   comfortable   denies any dangelo n however not reiable                                                                                                                                                                                                                                                                              Medications:  MEDICATIONS  (STANDING):  ALBUTerol/ipratropium for Nebulization 3 milliLiter(s) Nebulizer every 6 hours  dextrose 5% + sodium chloride 0.45%. 1000 milliLiter(s) (80 mL/Hr) IV Continuous <Continuous>  heparin  Injectable 5000 Unit(s) SubCutaneous every 12 hours  levothyroxine 50 MICROGram(s) Oral daily  nystatin    Suspension 953675 Unit(s) Oral four times a day  pantoprazole    Tablet 40 milliGRAM(s) Oral two times a day  sucralfate suspension 1 Gram(s) Oral four times a day    MEDICATIONS  (PRN):  senna 2 Tablet(s) Oral at bedtime PRN Constipation       Allergies    No Known Drug Allergies  shellfish (Anaphylaxis)    Intolerances      Vital Signs Last 24 Hrs  T(C): 36.6 (20 Feb 2019 14:12), Max: 36.6 (20 Feb 2019 14:12)  T(F): 97.8 (20 Feb 2019 14:12), Max: 97.8 (20 Feb 2019 14:12)  HR: 119 (20 Feb 2019 14:12) (95 - 119)  BP: 103/56 (20 Feb 2019 14:12) (103/56 - 132/80)  BP(mean): --  RR: 20 (20 Feb 2019 14:12) (18 - 20)  SpO2: 92% (20 Feb 2019 14:12) (92% - 95%)  CAPILLARY BLOOD GLUCOSE          02-19 @ 07:01  -  02-20 @ 07:00  --------------------------------------------------------  IN: 1420 mL / OUT: 0 mL / NET: 1420 mL      Physical Exam:    General: cachetic  HEENT:  Nonicteric, PERRLA  CV:  RRR, S1S2   Lungs:  CTA B/  Abdomen:  Soft, non-tender, no distended, positive BS  Extremities: edema  Neuro:  Alert  does not fully cooperate iwht exam but seems sstable                                                                                                                                                                                                                                                                             LABS:                               11.7   8.53  )-----------( 190      ( 19 Feb 2019 08:34 )             37.5                      02-20    145  |  109<H>  |  25<H>  ----------------------------<  147<H>  3.7   |  24  |  1.16    Ca    9.2      20 Feb 2019 06:56  Phos  3.9     02-19  Mg     1.7     02-19

## 2019-02-20 NOTE — CHART NOTE - NSCHARTNOTEFT_GEN_A_CORE
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  Pt has been disoriented for the last 2 wks, helena and screams at night, she started complaining difficulty swallowing and spitting out salivas. Also, Family notices blood in toilet (not sure from urine or stool), which rushes her to the ED. Pt lost more than 10lbs over the last month.  On Feb 15 EGD was done, Pathology shows no malignancy with changes consistent with chronic inflammation at the esophagus. 02/15 Barium Swallow showed Tortuous esophagus with hang-up of contrast in the mid to distal esophagus. As per GI patient is not a candidate for PEG tube due to several stomach surgeries in the past.  Noted: Esophagram Study terminated prior to assessment of barium pill due to aspiration; patient referred to Hospice for evaluation and family GOC    Diet : Dysphagia 1 Nectar thickened liquids. patient requires total feeding assistance, menus selections, encouragement. Patient requires IV fluid for poor/ inadequate oral intake of foods  liquids     Patient reports/ PO intake: patient confused, patient's son visited all afternoon, patient accepts small amounts at lunch after eating 75% at breakfast per RN      Daily Weight in k.1 (02-15)  BMI=19      Pertinent Medications: MEDICATIONS  (STANDING):  ALBUTerol/ipratropium for Nebulization 3 milliLiter(s) Nebulizer every 6 hours  dextrose 5% + sodium chloride 0.45%. 1000 milliLiter(s) (80 mL/Hr) IV Continuous <Continuous>  heparin  Injectable 5000 Unit(s) SubCutaneous every 12 hours  levothyroxine 50 MICROGram(s) Oral daily  nystatin    Suspension 670182 Unit(s) Oral four times a day  pantoprazole    Tablet 40 milliGRAM(s) Oral two times a day  sucralfate suspension 1 Gram(s) Oral four times a day    MEDICATIONS  (PRN):  senna 2 Tablet(s) Oral at bedtime PRN Constipation    Pertinent Labs:  @ 06:56: Na 145, BUN 25<H>, Cr 1.16, <H>, K+ 3.7, Phos --, Mg --, Alk Phos --, ALT/SGPT --, AST/SGOT --, HbA1c --    Finger Sticks: NA      Skin per nursing documentation: sacral decubiti st II  Edema:   left arm 2+    Estimated Needs:   X ] no change since previous assessment      Previous Nutrition Diagnosis: Malnutrition  Nutrition Diagnosis is: ongoing, addressed    New Nutrition Diagnosis: NA    Recommend  1. reduce ensure pudding to 2x daily  2. total assist with  meals   3. continue IVF , monitor hydration    Monitoring and Evaluation:     Continue to monitor Nutritional intake, Tolerance to diet prescription, weights, labs, skin integrity    RD remains available upon request and will follow up per protocol

## 2019-02-20 NOTE — PROGRESS NOTE ADULT - SUBJECTIVE AND OBJECTIVE BOX
CARDIOLOGY     PROGRESS  NOTE   ________________________________________________    CHIEF COMPLAINT:Patient is a 89y old  Female who presents with a chief complaint of le edema/failure to thrive (19 Feb 2019 18:44)    	  REVIEW OF SYSTEMS:  CONSTITUTIONAL: No fever, weight loss, or fatigue  EYES: No eye pain, visual disturbances, or discharge  ENT:  No difficulty hearing, tinnitus, vertigo; No sinus or throat pain  NECK: No pain or stiffness  RESPIRATORY: No cough, wheezing, chills or hemoptysis; No Shortness of Breath  CARDIOVASCULAR: No chest pain, palpitations, passing out, dizziness, or leg swelling  GASTROINTESTINAL: No abdominal or epigastric pain. No nausea, vomiting, or hematemesis; No diarrhea or constipation. No melena or hematochezia.  GENITOURINARY: No dysuria, frequency, hematuria, or incontinence  NEUROLOGICAL: No headaches, memory loss, loss of strength, numbness, or tremors  SKIN: No itching, burning, rashes, or lesions   LYMPH Nodes: No enlarged glands  ENDOCRINE: No heat or cold intolerance; No hair loss  MUSCULOSKELETAL: No joint pain or swelling; No muscle, back, or extremity pain  PSYCHIATRIC: No depression, anxiety, mood swings, or difficulty sleeping  HEME/LYMPH: No easy bruising, or bleeding gums  ALLERGY AND IMMUNOLOGIC: No hives or eczema	    [ ] All others negative	  [ ] Unable to obtain    PHYSICAL EXAM:  T(C): 36.3 (02-20-19 @ 04:20), Max: 36.4 (02-19-19 @ 22:44)  HR: 101 (02-20-19 @ 04:20) (93 - 109)  BP: 125/61 (02-20-19 @ 04:20) (104/62 - 144/78)  RR: 20 (02-20-19 @ 04:20) (18 - 20)  SpO2: 95% (02-20-19 @ 04:20) (95% - 98%)  Wt(kg): --  I&O's Summary    19 Feb 2019 07:01  -  20 Feb 2019 07:00  --------------------------------------------------------  IN: 1420 mL / OUT: 0 mL / NET: 1420 mL        Appearance + cachectic  HEENT:   Normal oral mucosa, PERRL, EOMI	  Lymphatic: No lymphadenopathy  Cardiovascular: Normal S1 S2, No JVD, + murmurs, No edema  Respiratory: Lungs clear to auscultation	  Psychiatry: A & O x 3, Mood & affect appropriate  Gastrointestinal:  Soft, Non-tender, + BS	  Skin: No rashes, No ecchymoses, No cyanosis	  Neurologic: Non-focal  Extremities: Normal range of motion, No clubbing, cyanosis or edema  Vascular: Peripheral pulses palpable 2+ bilaterally    MEDICATIONS  (STANDING):  ALBUTerol/ipratropium for Nebulization 3 milliLiter(s) Nebulizer every 6 hours  dextrose 5% + sodium chloride 0.45%. 1000 milliLiter(s) (80 mL/Hr) IV Continuous <Continuous>  heparin  Injectable 5000 Unit(s) SubCutaneous every 12 hours  levothyroxine 50 MICROGram(s) Oral daily  nystatin    Suspension 233822 Unit(s) Oral four times a day  pantoprazole    Tablet 40 milliGRAM(s) Oral two times a day  sucralfate suspension 1 Gram(s) Oral four times a day      TELEMETRY: 	    ECG:  	  RADIOLOGY:  OTHER: 	  	  LABS:	 	    CARDIAC MARKERS:  CARDIAC MARKERS ( 20 Feb 2019 06:56 )  x     / x     / 460 U/L / x     / x      CARDIAC MARKERS ( 19 Feb 2019 07:11 )  x     / x     / 1109 U/L / x     / x      CARDIAC MARKERS ( 18 Feb 2019 10:19 )  x     / x     / 1415 U/L / x     / x                                    11.7   8.53  )-----------( 190      ( 19 Feb 2019 08:34 )             37.5     02-20    145  |  109<H>  |  25<H>  ----------------------------<  147<H>  3.7   |  24  |  1.16    Ca    9.2      20 Feb 2019 06:56  Phos  3.9     02-19  Mg     1.7     02-19      proBNP: Serum Pro-Brain Natriuretic Peptide: 2551 pg/mL (02-14 @ 03:31)    Lipid Profile:   HgA1c:   TSH: Thyroid Stimulating Hormone, Serum: 5.70 uIU/mL (02-14 @ 05:28)          Assessment and plan  ---------------------------  hospice/palliative care appreciated  diet as tolerated  dvt prophylaxis  continue to follow up

## 2019-02-20 NOTE — PROGRESS NOTE ADULT - SUBJECTIVE AND OBJECTIVE BOX
Events noted. Poor sleep overnight per RN but no agitation and no neuroleptic given. CPK trending down (460). Pt. offers no complaints. She feels hungry now.       Vital Signs Last 24 Hrs  T(C): 36.3 (20 Feb 2019 04:20), Max: 36.4 (19 Feb 2019 22:44)  T(F): 97.4 (20 Feb 2019 04:20), Max: 97.5 (19 Feb 2019 22:44)  HR: 101 (20 Feb 2019 04:20) (93 - 109)  BP: 125/61 (20 Feb 2019 04:20) (104/62 - 144/78)  BP(mean): --  RR: 20 (20 Feb 2019 04:20) (18 - 20)  SpO2: 95% (20 Feb 2019 04:20) (95% - 98%)                          11.7   8.53  )-----------( 190      ( 19 Feb 2019 08:34 )             37.5       02-20    145  |  109<H>  |  25<H>  ----------------------------<  147<H>  3.7   |  24  |  1.16    Ca    9.2      20 Feb 2019 06:56  Phos  3.9     02-19  Mg     1.7     02-19                MEDICATIONS  (STANDING):  ALBUTerol/ipratropium for Nebulization 3 milliLiter(s) Nebulizer every 6 hours  dextrose 5% + sodium chloride 0.45%. 1000 milliLiter(s) (80 mL/Hr) IV Continuous <Continuous>  heparin  Injectable 5000 Unit(s) SubCutaneous every 12 hours  levothyroxine 50 MICROGram(s) Oral daily  nystatin    Suspension 460715 Unit(s) Oral four times a day  pantoprazole    Tablet 40 milliGRAM(s) Oral two times a day  sucralfate suspension 1 Gram(s) Oral four times a day    MEDICATIONS  (PRN):  senna 2 Tablet(s) Oral at bedtime PRN Constipation      Elderly WF in bed, calm, cooperative, hard of hearing,  alert and oriented x 3 .  No psychomotor abnormalities. No cogwheel nor leadpipe arm rigidity. No tremors nor diaphoresis. Insight and judgment are fair. Speech is dysarthric but coherent with normal rate and volume. No hallucinations nor delusions. The patient denied suicidal and homicidal ideation and plan. Mood is euthymic and affect constricted. Attention and concentration, short term memory, and long term memory within normal limits.

## 2019-02-20 NOTE — PROGRESS NOTE ADULT - ASSESSMENT
89 y/o female hx of gastric cancer and hypothyroid admitted with dysphagia/  acute GI bleed and weight loss     1- GI Bleed : no further episodes    H/H stable   change to po   avoid NSAIDS   transfuse PRN     2- dysphagia : EGD: < from: Upper Endoscopy (02.14.19 @ 16:27) >     -very tortuous ues, ? exterinsic compression       -? monilial candidiasis       -ulcer at anastamosis, s/p gastric resection                                                                                                        bx shkwed candida  cont antifungal   esophagram noted: pt had an episode of aspiration prior tbe given the pill : s/s evaluation          3- hypothyroid: cont meds   acceptable range     4- Anemia : monitor for now     5- edema : likely thrid spacing however consdier VA duplex r/o DVT     6- Encephalopathy :   unclear etiology however will need to consider NMS and other etiologies as in occult infection /asp pna etc   afeb  elevated CK ? sec to haldol ..now CK improving with IVF    CT head negative    Ct chest : negatvie for pna   psych input apprecaited   avoid haldol     discussed with son and    d.w np   PAS     palliative pt was seen and examined on   care rendered at that time input noted   hospice evaluation

## 2019-02-21 LAB
ANION GAP SERPL CALC-SCNC: 12 MMOL/L — SIGNIFICANT CHANGE UP (ref 5–17)
BUN SERPL-MCNC: 22 MG/DL — SIGNIFICANT CHANGE UP (ref 7–23)
CALCIUM SERPL-MCNC: 8.8 MG/DL — SIGNIFICANT CHANGE UP (ref 8.4–10.5)
CHLORIDE SERPL-SCNC: 108 MMOL/L — SIGNIFICANT CHANGE UP (ref 96–108)
CK SERPL-CCNC: 466 U/L — HIGH (ref 25–170)
CO2 SERPL-SCNC: 23 MMOL/L — SIGNIFICANT CHANGE UP (ref 22–31)
CREAT SERPL-MCNC: 1.05 MG/DL — SIGNIFICANT CHANGE UP (ref 0.5–1.3)
GLUCOSE SERPL-MCNC: 113 MG/DL — HIGH (ref 70–99)
POTASSIUM SERPL-MCNC: 3.4 MMOL/L — LOW (ref 3.5–5.3)
POTASSIUM SERPL-SCNC: 3.4 MMOL/L — LOW (ref 3.5–5.3)
SODIUM SERPL-SCNC: 143 MMOL/L — SIGNIFICANT CHANGE UP (ref 135–145)

## 2019-02-21 RX ORDER — SENNA PLUS 8.6 MG/1
2 TABLET ORAL
Qty: 0 | Refills: 0 | COMMUNITY
Start: 2019-02-21

## 2019-02-21 RX ORDER — IPRATROPIUM/ALBUTEROL SULFATE 18-103MCG
3 AEROSOL WITH ADAPTER (GRAM) INHALATION
Qty: 0 | Refills: 0 | COMMUNITY
Start: 2019-02-21

## 2019-02-21 RX ORDER — POTASSIUM CHLORIDE 20 MEQ
20 PACKET (EA) ORAL ONCE
Qty: 0 | Refills: 0 | Status: COMPLETED | OUTPATIENT
Start: 2019-02-21 | End: 2019-02-21

## 2019-02-21 RX ORDER — NYSTATIN 500MM UNIT
5 POWDER (EA) MISCELLANEOUS
Qty: 0 | Refills: 0 | COMMUNITY
Start: 2019-02-21

## 2019-02-21 RX ORDER — SUCRALFATE 1 G
10 TABLET ORAL
Qty: 0 | Refills: 0 | COMMUNITY
Start: 2019-02-21

## 2019-02-21 RX ADMIN — HEPARIN SODIUM 5000 UNIT(S): 5000 INJECTION INTRAVENOUS; SUBCUTANEOUS at 05:36

## 2019-02-21 RX ADMIN — Medication 1 GRAM(S): at 23:39

## 2019-02-21 RX ADMIN — Medication 3 MILLILITER(S): at 23:39

## 2019-02-21 RX ADMIN — Medication 3 MILLILITER(S): at 13:00

## 2019-02-21 RX ADMIN — Medication 3 MILLILITER(S): at 19:00

## 2019-02-21 RX ADMIN — Medication 500000 UNIT(S): at 19:51

## 2019-02-21 RX ADMIN — Medication 3 MILLILITER(S): at 05:35

## 2019-02-21 RX ADMIN — Medication 20 MILLIEQUIVALENT(S): at 13:34

## 2019-02-21 RX ADMIN — Medication 50 MICROGRAM(S): at 05:35

## 2019-02-21 RX ADMIN — PANTOPRAZOLE SODIUM 40 MILLIGRAM(S): 20 TABLET, DELAYED RELEASE ORAL at 19:52

## 2019-02-21 RX ADMIN — Medication 1 GRAM(S): at 13:33

## 2019-02-21 RX ADMIN — Medication 500000 UNIT(S): at 23:40

## 2019-02-21 RX ADMIN — PANTOPRAZOLE SODIUM 40 MILLIGRAM(S): 20 TABLET, DELAYED RELEASE ORAL at 05:37

## 2019-02-21 RX ADMIN — HEPARIN SODIUM 5000 UNIT(S): 5000 INJECTION INTRAVENOUS; SUBCUTANEOUS at 19:51

## 2019-02-21 RX ADMIN — Medication 500000 UNIT(S): at 05:35

## 2019-02-21 RX ADMIN — Medication 1 GRAM(S): at 19:50

## 2019-02-21 RX ADMIN — Medication 500000 UNIT(S): at 13:34

## 2019-02-21 RX ADMIN — Medication 1 GRAM(S): at 05:35

## 2019-02-21 NOTE — PROGRESS NOTE ADULT - SUBJECTIVE AND OBJECTIVE BOX
Patient is a 89y old  Female who presents with a chief complaint of le edema/failure to thrive (21 Feb 2019 14:27)                                                               INTERVAL HPI/OVERNIGHT EVENTS:    REVIEW OF SYSTEMS:     demented     denies any pain at this time   no cp or sob                                                                                                                                                                                                                                                                    Medications:  MEDICATIONS  (STANDING):  ALBUTerol/ipratropium for Nebulization 3 milliLiter(s) Nebulizer every 6 hours  heparin  Injectable 5000 Unit(s) SubCutaneous every 12 hours  levothyroxine 50 MICROGram(s) Oral daily  nystatin    Suspension 505107 Unit(s) Oral four times a day  pantoprazole    Tablet 40 milliGRAM(s) Oral two times a day  sucralfate suspension 1 Gram(s) Oral four times a day    MEDICATIONS  (PRN):  senna 2 Tablet(s) Oral at bedtime PRN Constipation       Allergies    No Known Drug Allergies  shellfish (Anaphylaxis)    Intolerances      Vital Signs Last 24 Hrs  T(C): 36.4 (21 Feb 2019 19:51), Max: 36.4 (21 Feb 2019 05:31)  T(F): 97.5 (21 Feb 2019 19:51), Max: 97.5 (21 Feb 2019 05:31)  HR: 97 (21 Feb 2019 19:51) (97 - 110)  BP: 126/80 (21 Feb 2019 19:51) (124/64 - 144/75)  BP(mean): --  RR: 18 (21 Feb 2019 19:51) (18 - 20)  SpO2: 93% (21 Feb 2019 19:51) (92% - 94%)  CAPILLARY BLOOD GLUCOSE          02-20 @ 07:01  -  02-21 @ 07:00  --------------------------------------------------------  IN: 2256 mL / OUT: 550 mL / NET: 1706 mL    02-21 @ 07:01  -  02-21 @ 20:51  --------------------------------------------------------  IN: 270 mL / OUT: 0 mL / NET: 270 mL      Physical Exam:    Daily     Daily   General:  NAD,   cachetic  HEENT:  Nonicteric, PERRLA  CV:  RRR, S1S2   Lungs:  CTA   Abdomen:  Soft, non-tender, no distended, positive BS  Extremities:  no edema  Neuro:  Alert  limited exam         LABS:                            02-21    143  |  108  |  22  ----------------------------<  113<H>  3.4<L>   |  23  |  1.05    Ca    8.8      21 Feb 2019 07:17

## 2019-02-21 NOTE — GOALS OF CARE CONVERSATION - PERSONAL ADVANCE DIRECTIVE - CONVERSATION DETAILS
Spoke by phone with pt's son Hector and his wife in late afternoon on 2/20.  Explained that pt did not currently meet criteria for admission to in level of hospice care with terminal dx of dementia as she had been able to walk and perform many ADLs prior to admission and that pt's recent wt loss could be related to findings on EGD.  Pt's daughter in law expressed understanding by restatement that family should contact [facility] at any point in the future if they feel the pt's condition has declined and she would be re-evaluated.  HERLINDA and Dr Howard made aware of results of evaluation earlier in the day.
Discussed patient's three year hx of functional decline - from complete independence to no longer performing IADL's, prior to admission, walking with walker, not cooking, self feeding, self toileting.  Presented with a GIB.  Endoscopy + for chronic inflammation, old gastrectomy.   Patient with FTT and frailty, skin failure, delirium and intermittent aggressive behavior.      W/u to date negative.

## 2019-02-21 NOTE — PROGRESS NOTE ADULT - SUBJECTIVE AND OBJECTIVE BOX
PRISCILLA RICHARDSON:[id number],   89yFemale followed for:  No Known Drug Allergies  shellfish (Anaphylaxis)    PAST MEDICAL & SURGICAL HISTORY:  Stomach cancer: 2003  Kidney stone  Hypothyroid  History of ovarian cyst: 2009    FAMILY HISTORY:    MEDICATIONS  (STANDING):  ALBUTerol/ipratropium for Nebulization 3 milliLiter(s) Nebulizer every 6 hours  dextrose 5% + sodium chloride 0.45%. 1000 milliLiter(s) (80 mL/Hr) IV Continuous <Continuous>  heparin  Injectable 5000 Unit(s) SubCutaneous every 12 hours  levothyroxine 50 MICROGram(s) Oral daily  nystatin    Suspension 700244 Unit(s) Oral four times a day  pantoprazole    Tablet 40 milliGRAM(s) Oral two times a day  sucralfate suspension 1 Gram(s) Oral four times a day    MEDICATIONS  (PRN):  senna 2 Tablet(s) Oral at bedtime PRN Constipation      Vital Signs Last 24 Hrs  T(C): 36.4 (21 Feb 2019 05:31), Max: 36.6 (20 Feb 2019 14:12)  T(F): 97.5 (21 Feb 2019 05:31), Max: 97.8 (20 Feb 2019 14:12)  HR: 98 (21 Feb 2019 05:31) (97 - 119)  BP: 128/72 (21 Feb 2019 05:31) (103/56 - 128/72)  BP(mean): --  RR: 19 (21 Feb 2019 05:31) (19 - 20)  SpO2: 93% (21 Feb 2019 05:31) (92% - 93%)  nc/at  s1s2  cta  soft, nt, nd no guarding or rebound  no c/c/e    CBC Full  -  ( 19 Feb 2019 08:34 )  WBC Count : 8.53 K/uL  Hemoglobin : 11.7 g/dL  Hematocrit : 37.5 %  Platelet Count - Automated : 190 K/uL  Mean Cell Volume : 98.4 fl  Mean Cell Hemoglobin : 30.7 pg  Mean Cell Hemoglobin Concentration : 31.2 gm/dL  Auto Neutrophil # : x  Auto Lymphocyte # : x  Auto Monocyte # : x  Auto Eosinophil # : x  Auto Basophil # : x  Auto Neutrophil % : x  Auto Lymphocyte % : x  Auto Monocyte % : x  Auto Eosinophil % : x  Auto Basophil % : x    02-21    143  |  108  |  22  ----------------------------<  113<H>  3.4<L>   |  23  |  1.05    Ca    8.8      21 Feb 2019 07:17

## 2019-02-21 NOTE — SWALLOW BEDSIDE ASSESSMENT ADULT - SWALLOW EVAL: RECOMMENDED DIET
As previously stated, pt presents as an aspiration risk with all p.o. textures. This was reviewed with medicine NP Trinh, who reiterated that GOC is to maintain pt on a p.o. diet. Will therefore defer nutritional management plan to palliative and medicine service

## 2019-02-21 NOTE — PROGRESS NOTE ADULT - SUBJECTIVE AND OBJECTIVE BOX
Events noted. Eating "a little" per family at bedside. No agitation. More alert.       Vital Signs Last 24 Hrs  T(C): 36.2 (21 Feb 2019 11:18), Max: 36.4 (21 Feb 2019 05:31)  T(F): 97.2 (21 Feb 2019 11:18), Max: 97.5 (21 Feb 2019 05:31)  HR: 110 (21 Feb 2019 11:18) (97 - 110)  BP: 144/75 (21 Feb 2019 11:18) (111/58 - 144/75)  BP(mean): --  RR: 18 (21 Feb 2019 11:18) (18 - 20)  SpO2: 94% (21 Feb 2019 11:18) (92% - 94%)        02-21    143  |  108  |  22  ----------------------------<  113<H>  3.4<L>   |  23  |  1.05    Ca    8.8      21 Feb 2019 07:17                MEDICATIONS  (STANDING):  ALBUTerol/ipratropium for Nebulization 3 milliLiter(s) Nebulizer every 6 hours  heparin  Injectable 5000 Unit(s) SubCutaneous every 12 hours  levothyroxine 50 MICROGram(s) Oral daily  nystatin    Suspension 107882 Unit(s) Oral four times a day  pantoprazole    Tablet 40 milliGRAM(s) Oral two times a day  sucralfate suspension 1 Gram(s) Oral four times a day    MEDICATIONS  (PRN):  senna 2 Tablet(s) Oral at bedtime PRN Constipation      Elderly WF in bed, calm, cooperative, hard of hearing, alert and oriented x 2-3 (year is "20") .  No psychomotor abnormalities. Insight and judgment are fair. Speech is coherent with normal rate and volume. No hallucinations nor delusions. The patient denied suicidal and homicidal ideation and plan. Mood is frustrated (she wants to go home) and affect constricted.  Attention and concentration fair but impaired short term memory. Long term memory within normal limits.

## 2019-02-21 NOTE — SWALLOW BEDSIDE ASSESSMENT ADULT - SWALLOW EVAL: DIAGNOSIS
Pt adm with dysphagia with associated severe malnutrition and inability to manage secretions on adm. EGD showed very tortuous UES with ? external compression and chronic inflammation on Bx (? lymphocytic esophagitis and/or GERD). Pt noted to also have an esophagram, during which she aspirated and was found to have a tortuous esophagus with "hang-up" of contrast in the mid to distal esophagus. During the hospital course, the patient had a episode of RD with "gurgling", with concern for aspiration. Pt is being followed by GI and Palliative. Per GI, PEG can't be placed due to anatomy. Per Palliative, GOC is comfort and they do not want any tube feeds.  Pt now being referred for a swallow evaluation to determine if diet can potentially be upgraded as pt is on a dysphagia diet. Given hx and current swallow profile (hannah-pharyngeal and documented esophageal dysphagia), pt presents as an aspiration risk with all p.o. textures.

## 2019-02-21 NOTE — PROGRESS NOTE ADULT - ASSESSMENT
Delirium resolving  Baseline mild dementia (history of sundowning at home and history of decline in ability to pay bills over the last 3 mos. per son)    Recommend  No neuroleptics. As pt. to be discharged over the next 24 hours, will not start an antidepressant medication at this time. Family says they will bring in pureed food from the outside as pt. prefers this. Staff will need to approve this food (aspiration risk).  If pt. goes to a nursing home, psychiatrist there will need to followup. Otherwise, family can take pt. for followup to [facility] ([phone]).    Armando Blanca M.D.  Psychiatry  [phone]

## 2019-02-21 NOTE — PROVIDER CONTACT NOTE (OTHER) - ACTION/TREATMENT ORDERED:
Bladder Scan as per NP, NP notified bladder scan showed 674. pt straight cath, collected 550 of urine, urine contained moderate amount of brown sand like sediment. order to bladder scan q8hr ordered

## 2019-02-21 NOTE — PROGRESS NOTE ADULT - ASSESSMENT
89 y/o female hx of gastric cancer and hypothyroid admitted with dysphagia/  acute GI bleed and weight loss     1- GI Bleed : no further episodes    H/H stable   change to po   avoid NSAIDS   transfuse PRN     2- dysphagia : EGD: < from: Upper Endoscopy (02.14.19 @ 16:27) >     -very tortuous ues, ? exterinsic compression       -? monilial candidiasis       -ulcer at anastamosis, s/p gastric resection                                                                                                        bx shkwed candida  cont antifungal   esophagram noted: pt had an episode of aspiration prior tbe given the pill : s/s evaluation appreciated ... defer to family re : diet vs npo as long they understand risks of aspiration         3- hypothyroid: cont meds   acceptable range     4- Anemia : monitor for now     5- edema : likely thrid spacing however consdier VA duplex r/o DVT     6- Encephalopathy :   unclear etiology however will need to consider NMS and other etiologies as in occult infection /asp pna etc   afeb  elevated CK ? sec to haldol ..now CK improving with IVF    CT head negative    Ct chest : negatvie for pna   psych input apprecaited   avoid haldol       f/u palliative and hospice

## 2019-02-21 NOTE — SWALLOW BEDSIDE ASSESSMENT ADULT - SLP GENERAL OBSERVATIONS
Pt asleep - alerts upon contact, [place]- this clinician needed to increase vocal volume significantly and speak directly into L ear in order for patient to comprehend, Pt appears frail, cachectic, with impaired mobility, with slight R UE resting tremor, poor head and neck and trunk control- required external support for upright positioning. Pt frightened that she is going to fall OOB, and reassured that she is safely in middle of bed.  Speech dysarthric - weak, slow rate, short phrases, mild articulatory distortions, mild hoarseness. On 2L NC. Noted to have a bag of napkins on her bed and handfuls of napkins strewn across her bed, some with visible secretions. when questioned re: same, pt also endorsed that uses them to spit up food. Pt oriented partially - knew mo, but not year or date. When questioned re: swallow function PTA- stated that she had onset of dysphagia 2 days prior to admit, when a piece of fish "got stuck in my throat" -indicated that dysphagia was progressive in nature. Pt did not appear to be a good historian when denied all other s/s of dysphagia and complications assocaited with same

## 2019-02-21 NOTE — PROGRESS NOTE ADULT - SUBJECTIVE AND OBJECTIVE BOX
CARDIOLOGY     PROGRESS  NOTE   ________________________________________________    CHIEF COMPLAINT:Patient is a 89y old  Female who presents with a chief complaint of le edema/failure to thrive (20 Feb 2019 18:55)  doing better.  	  REVIEW OF SYSTEMS:  CONSTITUTIONAL: No fever, weight loss, or fatigue  EYES: No eye pain, visual disturbances, or discharge  ENT:  No difficulty hearing, tinnitus, vertigo; No sinus or throat pain  NECK: No pain or stiffness  RESPIRATORY: No cough, wheezing, chills or hemoptysis; No Shortness of Breath  CARDIOVASCULAR: No chest pain, palpitations, passing out, dizziness, or leg swelling  GASTROINTESTINAL: No abdominal or epigastric pain. No nausea, vomiting, or hematemesis; No diarrhea or constipation. No melena or hematochezia.  GENITOURINARY: No dysuria, frequency, hematuria, or incontinence  NEUROLOGICAL: No headaches, memory loss, loss of strength, numbness, or tremors  SKIN: No itching, burning, rashes, or lesions   LYMPH Nodes: No enlarged glands  ENDOCRINE: No heat or cold intolerance; No hair loss  MUSCULOSKELETAL: No joint pain or swelling; No muscle, back, or extremity pain  PSYCHIATRIC: No depression, anxiety, mood swings, or difficulty sleeping  HEME/LYMPH: No easy bruising, or bleeding gums  ALLERGY AND IMMUNOLOGIC: No hives or eczema	    [ ] All others negative	  [x ] Unable to obtain    PHYSICAL EXAM:  T(C): 36.4 (02-21-19 @ 05:31), Max: 36.6 (02-20-19 @ 14:12)  HR: 98 (02-21-19 @ 05:31) (97 - 119)  BP: 128/72 (02-21-19 @ 05:31) (103/56 - 128/72)  RR: 19 (02-21-19 @ 05:31) (19 - 20)  SpO2: 93% (02-21-19 @ 05:31) (92% - 93%)  Wt(kg): --  I&O's Summary    20 Feb 2019 07:01  -  21 Feb 2019 07:00  --------------------------------------------------------  IN: 2256 mL / OUT: 550 mL / NET: 1706 mL        Appearance: cachectic  HEENT:   Normal oral mucosa, PERRL, EOMI	  Lymphatic: No lymphadenopathy  Cardiovascular: Normal S1 S2, No JVD, + murmurs, No edema  Respiratory: Lungs clear to auscultation	  Psychiatry: A & O x 3, Mood & affect appropriate  Gastrointestinal:  Soft, Non-tender, + BS	  Skin: No rashes, No ecchymoses, No cyanosis	  Neurologic: Non-focal  Extremities: Normal range of motion, No clubbing, cyanosis or edema  Vascular: Peripheral pulses palpable 2+ bilaterally    MEDICATIONS  (STANDING):  ALBUTerol/ipratropium for Nebulization 3 milliLiter(s) Nebulizer every 6 hours  dextrose 5% + sodium chloride 0.45%. 1000 milliLiter(s) (80 mL/Hr) IV Continuous <Continuous>  heparin  Injectable 5000 Unit(s) SubCutaneous every 12 hours  levothyroxine 50 MICROGram(s) Oral daily  nystatin    Suspension 276109 Unit(s) Oral four times a day  pantoprazole    Tablet 40 milliGRAM(s) Oral two times a day  sucralfate suspension 1 Gram(s) Oral four times a day      TELEMETRY: 	    ECG:  	  RADIOLOGY:  OTHER: 	  	  LABS:	 	    CARDIAC MARKERS:  CARDIAC MARKERS ( 20 Feb 2019 06:56 )  x     / x     / 460 U/L / x     / x                                    11.7   8.53  )-----------( 190      ( 19 Feb 2019 08:34 )             37.5     02-20    145  |  109<H>  |  25<H>  ----------------------------<  147<H>  3.7   |  24  |  1.16    Ca    9.2      20 Feb 2019 06:56      proBNP: Serum Pro-Brain Natriuretic Peptide: 2551 pg/mL (02-14 @ 03:31)    Lipid Profile:   HgA1c:   TSH: Thyroid Stimulating Hormone, Serum: 5.70 uIU/mL (02-14 @ 05:28)          Assessment and plan  ---------------------------  doing better  appetite improving  oob to chair/physical therapy

## 2019-02-22 LAB
FOLATE SERPL-MCNC: 3.1 NG/ML — LOW
VIT B12 SERPL-MCNC: 818 PG/ML — SIGNIFICANT CHANGE UP (ref 232–1245)

## 2019-02-22 RX ORDER — FLUCONAZOLE 150 MG/1
200 TABLET ORAL ONCE
Qty: 0 | Refills: 0 | Status: COMPLETED | OUTPATIENT
Start: 2019-02-22 | End: 2019-02-22

## 2019-02-22 RX ORDER — FOLIC ACID 0.8 MG
1 TABLET ORAL
Qty: 0 | Refills: 0 | COMMUNITY
Start: 2019-02-22

## 2019-02-22 RX ORDER — HEPARIN SODIUM 5000 [USP'U]/ML
5000 INJECTION INTRAVENOUS; SUBCUTANEOUS
Qty: 0 | Refills: 0 | COMMUNITY
Start: 2019-02-22

## 2019-02-22 RX ORDER — FLUCONAZOLE 150 MG/1
100 TABLET ORAL EVERY 24 HOURS
Qty: 0 | Refills: 0 | Status: DISCONTINUED | OUTPATIENT
Start: 2019-02-23 | End: 2019-02-23

## 2019-02-22 RX ORDER — FLUCONAZOLE 150 MG/1
1 TABLET ORAL
Qty: 0 | Refills: 0 | COMMUNITY

## 2019-02-22 RX ORDER — FOLIC ACID 0.8 MG
1 TABLET ORAL DAILY
Qty: 0 | Refills: 0 | Status: DISCONTINUED | OUTPATIENT
Start: 2019-02-22 | End: 2019-02-23

## 2019-02-22 RX ADMIN — Medication 3 MILLILITER(S): at 17:35

## 2019-02-22 RX ADMIN — FLUCONAZOLE 100 MILLIGRAM(S): 150 TABLET ORAL at 11:43

## 2019-02-22 RX ADMIN — Medication 1 GRAM(S): at 17:35

## 2019-02-22 RX ADMIN — Medication 1 GRAM(S): at 05:44

## 2019-02-22 RX ADMIN — Medication 3 MILLILITER(S): at 11:43

## 2019-02-22 RX ADMIN — HEPARIN SODIUM 5000 UNIT(S): 5000 INJECTION INTRAVENOUS; SUBCUTANEOUS at 05:44

## 2019-02-22 RX ADMIN — Medication 3 MILLILITER(S): at 23:34

## 2019-02-22 RX ADMIN — Medication 50 MICROGRAM(S): at 05:44

## 2019-02-22 RX ADMIN — Medication 500000 UNIT(S): at 12:54

## 2019-02-22 RX ADMIN — PANTOPRAZOLE SODIUM 40 MILLIGRAM(S): 20 TABLET, DELAYED RELEASE ORAL at 17:35

## 2019-02-22 RX ADMIN — Medication 3 MILLILITER(S): at 05:44

## 2019-02-22 RX ADMIN — PANTOPRAZOLE SODIUM 40 MILLIGRAM(S): 20 TABLET, DELAYED RELEASE ORAL at 05:45

## 2019-02-22 RX ADMIN — Medication 1 GRAM(S): at 11:43

## 2019-02-22 RX ADMIN — Medication 500000 UNIT(S): at 05:44

## 2019-02-22 RX ADMIN — HEPARIN SODIUM 5000 UNIT(S): 5000 INJECTION INTRAVENOUS; SUBCUTANEOUS at 17:35

## 2019-02-22 RX ADMIN — Medication 1 GRAM(S): at 23:34

## 2019-02-22 NOTE — PROGRESS NOTE ADULT - SUBJECTIVE AND OBJECTIVE BOX
Events noted. Pt. offers no complaints. She says she has an appetite. No agitation. Sleeps well. No fevers. . No neuroleptics given over the past several days.       Vital Signs Last 24 Hrs  T(C): 36.3 (22 Feb 2019 11:30), Max: 36.4 (21 Feb 2019 19:51)  T(F): 97.3 (22 Feb 2019 11:30), Max: 97.5 (21 Feb 2019 19:51)  HR: 93 (22 Feb 2019 11:30) (89 - 97)  BP: 95/53 (22 Feb 2019 11:30) (95/53 - 126/80)  BP(mean): --  RR: 18 (22 Feb 2019 11:30) (17 - 18)  SpO2: 94% (22 Feb 2019 11:30) (93% - 96%)        02-21    143  |  108  |  22  ----------------------------<  113<H>  3.4<L>   |  23  |  1.05    Ca    8.8      21 Feb 2019 07:17                MEDICATIONS  (STANDING):  ALBUTerol/ipratropium for Nebulization 3 milliLiter(s) Nebulizer every 6 hours  heparin  Injectable 5000 Unit(s) SubCutaneous every 12 hours  levothyroxine 50 MICROGram(s) Oral daily  pantoprazole    Tablet 40 milliGRAM(s) Oral two times a day  sucralfate suspension 1 Gram(s) Oral four times a day    MEDICATIONS  (PRN):  senna 2 Tablet(s) Oral at bedtime PRN Constipation      Elderly WF in bed, calm, cooperative, alert and oriented x 3. Hard of hearing .  No psychomotor abnormalities. Insight and judgment are fair. Speech is dysarthric but coherent with normal rate and volume. No hallucinations nor delusions. The patient denied suicidal and homicidal ideation and plan. Mood is euthymic and affect constricted. Attention and concentration fair but impaired short term memory (she has trouble recalling what she ate today) and long term memory within normal limits.

## 2019-02-22 NOTE — CHART NOTE - NSCHARTNOTEFT_GEN_A_CORE
follow up- Seen by speech pathologist; advised NPO; Pt on most conservative dysphagia diet at this time; family aware and want to keep on diet; family is aware of risk of aspiration. pt is DNR. MOLST form in chart.  Trinh Valderrama(NP)  [address], [phone]

## 2019-02-22 NOTE — CHART NOTE - NSCHARTNOTEFT_GEN_A_CORE
follow up-per d/w Dr. Urias added diflucan for  candidal esophagitis.  Trinh Valderrama(NP)  [facility], [phone]

## 2019-02-22 NOTE — PROGRESS NOTE ADULT - ASSESSMENT
89 y/o female hx of gastric cancer and hypothyroid admitted with dysphagia/  acute GI bleed and weight loss     1- GI Bleed : no further episodes    H/H stable   change to po protonix   avoid NSAIDS   transfuse PRN     2- dysphagia : EGD: < from: Upper Endoscopy (02.14.19 @ 16:27) >     -very tortuous ues, ? exterinsic compression       -? monilial candidiasis       -ulcer at anastamosis, s/p gastric resection                                                                                                        bx shkwed candida  cont antifungal .. would change to diflucan for 14 days   esophagram noted: pt had an episode of aspiration prior tbe given the pill : s/s evaluation appreciated ... defer to family re : diet vs npo as long they understand risks of aspiration         3- hypothyroid: cont meds   acceptable range     4- Anemia : monitor for now     5- edema : likely thrid spacing however consdier VA duplex r/o DVT     6- Encephalopathy :     now at baseline   unclear etiology however will need to consider NMS and other etiologies as in occult infection /asp pna etc   afeb  elevated CK ? sec to haldol ..now CK improving with IVF    CT head negative    Ct chest : negatvie for pna   psych input apprecaited   avoid haldol     7- urinary retention : cont mendez and TOV in 24 -48 hours  ( can be done at rehab )       f/u palliative and hospice 89 y/o female hx of gastric cancer and hypothyroid admitted with dysphagia/  acute GI bleed and weight loss     1- GI Bleed : no further episodes    H/H stable   change to po protonix   avoid NSAIDS   transfuse PRN     2- dysphagia : EGD: < from: Upper Endoscopy (02.14.19 @ 16:27) >     -very tortuous ues, ? exterinsic compression       -? monilial candidiasis       -ulcer at anastamosis, s/p gastric resection                                                                                                        bx shkwed candida  cont antifungal .. would change to diflucan for 14 days   esophagram noted: pt had an episode of aspiration prior tbe given the pill : s/s evaluation appreciated ... defer to family re : diet vs npo as long they understand risks of aspiration         3- hypothyroid: cont meds   acceptable range     4- Anemia : monitor for now     5- edema : likely thrid spacing however consdier VA duplex r/o DVT     6- Encephalopathy :     now at baseline   unclear etiology however will need to consider NMS and other etiologies as in occult infection /asp pna etc   afeb  elevated CK ? sec to haldol ..now CK improving with IVF    CT head negative    Ct chest : negatvie for pna   psych input apprecaited   avoid haldol     7- urinary retention : cont mendez and TOV in 24 -48 hours  ( can be done at rehab )     8- hypokalemia : replete and montiro   check Mag      f/u palliative and hospice

## 2019-02-22 NOTE — PROGRESS NOTE ADULT - SUBJECTIVE AND OBJECTIVE BOX
CARDIOLOGY     PROGRESS  NOTE   ________________________________________________    CHIEF COMPLAINT:Patient is a 89y old  Female who presents with a chief complaint of le edema/failure to thrive (21 Feb 2019 20:51)  doing better, appetite is improving.  	  REVIEW OF SYSTEMS:  CONSTITUTIONAL: No fever, weight loss, or fatigue  EYES: No eye pain, visual disturbances, or discharge  ENT:  No difficulty hearing, tinnitus, vertigo; No sinus or throat pain  NECK: No pain or stiffness  RESPIRATORY: No cough, wheezing, chills or hemoptysis; No Shortness of Breath  CARDIOVASCULAR: No chest pain, palpitations, passing out, dizziness, or leg swelling  GASTROINTESTINAL: No abdominal or epigastric pain. No nausea, vomiting, or hematemesis; No diarrhea or constipation. No melena or hematochezia.  GENITOURINARY: No dysuria, frequency, hematuria, or incontinence  NEUROLOGICAL: No headaches, memory loss, loss of strength, numbness, or tremors  SKIN: No itching, burning, rashes, or lesions   LYMPH Nodes: No enlarged glands  ENDOCRINE: No heat or cold intolerance; No hair loss  MUSCULOSKELETAL: No joint pain or swelling; No muscle, back, or extremity pain  PSYCHIATRIC: No depression, anxiety, mood swings, or difficulty sleeping  HEME/LYMPH: No easy bruising, or bleeding gums  ALLERGY AND IMMUNOLOGIC: No hives or eczema	    [ ] All others negative	  [x ] Unable to obtain    PHYSICAL EXAM:  T(C): 36.2 (02-22-19 @ 05:39), Max: 36.4 (02-21-19 @ 19:51)  HR: 89 (02-22-19 @ 05:39) (89 - 110)  BP: 120/69 (02-22-19 @ 05:39) (120/69 - 144/75)  RR: 17 (02-22-19 @ 05:39) (17 - 18)  SpO2: 96% (02-22-19 @ 05:39) (93% - 96%)  Wt(kg): --  I&O's Summary    21 Feb 2019 07:01  -  22 Feb 2019 07:00  --------------------------------------------------------  IN: 490 mL / OUT: 350 mL / NET: 140 mL        Appearance: Normal	  HEENT:   Normal oral mucosa, PERRL, EOMI	  Lymphatic: No lymphadenopathy  Cardiovascular: Normal S1 S2, No JVD,+ murmurs, + edema  Respiratory: Lungs clear to auscultation	  Psychiatry: A & O x 3, Mood & affect appropriate  Gastrointestinal:  Soft, Non-tender, + BS	  Skin: No rashes, No ecchymoses, No cyanosis	  Neurologic: Non-focal  Extremities: Normal range of motion, No clubbing, cyanosis or edema  Vascular: Peripheral pulses palpable 2+ bilaterally    MEDICATIONS  (STANDING):  ALBUTerol/ipratropium for Nebulization 3 milliLiter(s) Nebulizer every 6 hours  heparin  Injectable 5000 Unit(s) SubCutaneous every 12 hours  levothyroxine 50 MICROGram(s) Oral daily  nystatin    Suspension 365134 Unit(s) Oral four times a day  pantoprazole    Tablet 40 milliGRAM(s) Oral two times a day  sucralfate suspension 1 Gram(s) Oral four times a day      TELEMETRY: 	    ECG:  	  RADIOLOGY:  OTHER: 	  	  LABS:	 	    CARDIAC MARKERS:  CARDIAC MARKERS ( 21 Feb 2019 07:17 )  x     / x     / 466 U/L / x     / x                02-21    143  |  108  |  22  ----------------------------<  113<H>  3.4<L>   |  23  |  1.05    Ca    8.8      21 Feb 2019 07:17      proBNP: Serum Pro-Brain Natriuretic Peptide: 2551 pg/mL (02-14 @ 03:31)    Lipid Profile:   HgA1c:   TSH: Thyroid Stimulating Hormone, Serum: 5.70 uIU/mL (02-14 @ 05:28)          Assessment and plan  ---------------------------  doing better  increase nutrition  dvt prophylaxis  urinary retention  urology eval  check uc  not a candidate for any cardiac work up

## 2019-02-22 NOTE — PROGRESS NOTE ADULT - SUBJECTIVE AND OBJECTIVE BOX
PRISCILLA RICHARDSON:[id number],   89yFemale followed for:  No Known Drug Allergies  shellfish (Anaphylaxis)    PAST MEDICAL & SURGICAL HISTORY:  Stomach cancer: 2003  Kidney stone  Hypothyroid  History of ovarian cyst: 2009    FAMILY HISTORY:    MEDICATIONS  (STANDING):  ALBUTerol/ipratropium for Nebulization 3 milliLiter(s) Nebulizer every 6 hours  fluconAZOLE IVPB 200 milliGRAM(s) IV Intermittent once  heparin  Injectable 5000 Unit(s) SubCutaneous every 12 hours  levothyroxine 50 MICROGram(s) Oral daily  nystatin    Suspension 623257 Unit(s) Oral four times a day  pantoprazole    Tablet 40 milliGRAM(s) Oral two times a day  sucralfate suspension 1 Gram(s) Oral four times a day    MEDICATIONS  (PRN):  senna 2 Tablet(s) Oral at bedtime PRN Constipation      Vital Signs Last 24 Hrs  T(C): 36.2 (22 Feb 2019 05:39), Max: 36.4 (21 Feb 2019 19:51)  T(F): 97.2 (22 Feb 2019 05:39), Max: 97.5 (21 Feb 2019 19:51)  HR: 89 (22 Feb 2019 05:39) (89 - 110)  BP: 120/69 (22 Feb 2019 05:39) (120/69 - 144/75)  BP(mean): --  RR: 17 (22 Feb 2019 05:39) (17 - 18)  SpO2: 96% (22 Feb 2019 05:39) (93% - 96%)  nc/at  s1s2  cta  soft, nt, nd no guarding or rebound  no c/c/e      02-21    143  |  108  |  22  ----------------------------<  113<H>  3.4<L>   |  23  |  1.05    Ca    8.8      21 Feb 2019 07:17

## 2019-02-22 NOTE — CHART NOTE - NSCHARTNOTEFT_GEN_A_CORE
[facility] Event Note    Informed by RN of bladder scan showing greater than 301 mL.  Called Dr. Urias and confirmed placing Duron for retention.      Teetee Bhatt PA-C  Dept of Medicine

## 2019-02-22 NOTE — PROGRESS NOTE ADULT - SUBJECTIVE AND OBJECTIVE BOX
Patient is a 89y old  Female who presents with a chief complaint of le edema/failure to thrive (22 Feb 2019 09:56)                                                               INTERVAL HPI/OVERNIGHT EVENTS:    REVIEW OF SYSTEMS:     CONSTITUTIONAL: No weakness, fevers or chills  RESPIRATORY: No cough, wheezing,  No shortness of breath  CARDIOVASCULAR: No chest pain or palpitations  GASTROINTESTINAL: No abdominal pain  . No nausea, vomiting, or hematemesis; No diarrhea or constipation. No melena or hematochezia.  GENITOURINARY: No dysuria, frequency or hematuria  NEUROLOGICAL: No numbness or weakness                                                                                                                                                                                                                                                                               Medications:  MEDICATIONS  (STANDING):  ALBUTerol/ipratropium for Nebulization 3 milliLiter(s) Nebulizer every 6 hours  heparin  Injectable 5000 Unit(s) SubCutaneous every 12 hours  levothyroxine 50 MICROGram(s) Oral daily  nystatin    Suspension 863303 Unit(s) Oral four times a day  pantoprazole    Tablet 40 milliGRAM(s) Oral two times a day  sucralfate suspension 1 Gram(s) Oral four times a day    MEDICATIONS  (PRN):  senna 2 Tablet(s) Oral at bedtime PRN Constipation       Allergies    No Known Drug Allergies  shellfish (Anaphylaxis)    Intolerances      Vital Signs Last 24 Hrs  T(C): 36.3 (22 Feb 2019 11:30), Max: 36.4 (21 Feb 2019 19:51)  T(F): 97.3 (22 Feb 2019 11:30), Max: 97.5 (21 Feb 2019 19:51)  HR: 93 (22 Feb 2019 11:30) (89 - 97)  BP: 95/53 (22 Feb 2019 11:30) (95/53 - 126/80)  BP(mean): --  RR: 18 (22 Feb 2019 11:30) (17 - 18)  SpO2: 94% (22 Feb 2019 11:30) (93% - 96%)  CAPILLARY BLOOD GLUCOSE          02-21 @ 07:01  -  02-22 @ 07:00  --------------------------------------------------------  IN: 490 mL / OUT: 350 mL / NET: 140 mL    02-22 @ 07:01  -  02-22 @ 14:35  --------------------------------------------------------  IN: 120 mL / OUT: 0 mL / NET: 120 mL      Physical Exam:    General: NAD , cachectic   HEENT:  Nonicteric, PERRLA  CV:  RRR, S1S2   Lungs:  CTA B/L, no wheezes, rales, rhonchi  Abdomen:  Soft, non-tender, no distended, positive BS  Extremities:  2+ pulses, no c/c, no edema    Neuro:  Alert  grosslly          LABS:                            02-21    143  |  108  |  22  ----------------------------<  113<H>  3.4<L>   |  23  |  1.05    Ca    8.8      21 Feb 2019 07:17                         VERONICA

## 2019-02-22 NOTE — PROGRESS NOTE ADULT - ASSESSMENT
Delirium resolved.   No sx. of NMS  Dementia.    Recommend  No neuroleptics.   No antidepressant med for now.   Followup at [facility].    Armando Blanca M.D.  Psychiatry  [phone]

## 2019-02-23 VITALS
SYSTOLIC BLOOD PRESSURE: 125 MMHG | OXYGEN SATURATION: 100 % | HEART RATE: 92 BPM | TEMPERATURE: 97 F | DIASTOLIC BLOOD PRESSURE: 74 MMHG | RESPIRATION RATE: 18 BRPM

## 2019-02-23 LAB
ANION GAP SERPL CALC-SCNC: 11 MMOL/L — SIGNIFICANT CHANGE UP (ref 5–17)
BUN SERPL-MCNC: 23 MG/DL — SIGNIFICANT CHANGE UP (ref 7–23)
CALCIUM SERPL-MCNC: 9.3 MG/DL — SIGNIFICANT CHANGE UP (ref 8.4–10.5)
CHLORIDE SERPL-SCNC: 110 MMOL/L — HIGH (ref 96–108)
CO2 SERPL-SCNC: 26 MMOL/L — SIGNIFICANT CHANGE UP (ref 22–31)
CREAT SERPL-MCNC: 0.95 MG/DL — SIGNIFICANT CHANGE UP (ref 0.5–1.3)
GLUCOSE SERPL-MCNC: 82 MG/DL — SIGNIFICANT CHANGE UP (ref 70–99)
HCT VFR BLD CALC: 38 % — SIGNIFICANT CHANGE UP (ref 34.5–45)
HGB BLD-MCNC: 11.6 G/DL — SIGNIFICANT CHANGE UP (ref 11.5–15.5)
MAGNESIUM SERPL-MCNC: 1.8 MG/DL — SIGNIFICANT CHANGE UP (ref 1.6–2.6)
MCHC RBC-ENTMCNC: 30.4 PG — SIGNIFICANT CHANGE UP (ref 27–34)
MCHC RBC-ENTMCNC: 30.5 GM/DL — LOW (ref 32–36)
MCV RBC AUTO: 99.7 FL — SIGNIFICANT CHANGE UP (ref 80–100)
PLATELET # BLD AUTO: 212 K/UL — SIGNIFICANT CHANGE UP (ref 150–400)
POTASSIUM SERPL-MCNC: 3.9 MMOL/L — SIGNIFICANT CHANGE UP (ref 3.5–5.3)
POTASSIUM SERPL-SCNC: 3.9 MMOL/L — SIGNIFICANT CHANGE UP (ref 3.5–5.3)
RBC # BLD: 3.81 M/UL — SIGNIFICANT CHANGE UP (ref 3.8–5.2)
RBC # FLD: 15.2 % — HIGH (ref 10.3–14.5)
SODIUM SERPL-SCNC: 147 MMOL/L — HIGH (ref 135–145)
WBC # BLD: 5.33 K/UL — SIGNIFICANT CHANGE UP (ref 3.8–10.5)
WBC # FLD AUTO: 5.33 K/UL — SIGNIFICANT CHANGE UP (ref 3.8–10.5)

## 2019-02-23 PROCEDURE — 84480 ASSAY TRIIODOTHYRONINE (T3): CPT

## 2019-02-23 PROCEDURE — 71045 X-RAY EXAM CHEST 1 VIEW: CPT

## 2019-02-23 PROCEDURE — 70450 CT HEAD/BRAIN W/O DYE: CPT

## 2019-02-23 PROCEDURE — 71250 CT THORAX DX C-: CPT

## 2019-02-23 PROCEDURE — 88312 SPECIAL STAINS GROUP 1: CPT

## 2019-02-23 PROCEDURE — 84443 ASSAY THYROID STIM HORMONE: CPT

## 2019-02-23 PROCEDURE — 99285 EMERGENCY DEPT VISIT HI MDM: CPT | Mod: 25

## 2019-02-23 PROCEDURE — 97530 THERAPEUTIC ACTIVITIES: CPT

## 2019-02-23 PROCEDURE — 87086 URINE CULTURE/COLONY COUNT: CPT

## 2019-02-23 PROCEDURE — 83880 ASSAY OF NATRIURETIC PEPTIDE: CPT

## 2019-02-23 PROCEDURE — 51701 INSERT BLADDER CATHETER: CPT

## 2019-02-23 PROCEDURE — 82746 ASSAY OF FOLIC ACID SERUM: CPT

## 2019-02-23 PROCEDURE — 92610 EVALUATE SWALLOWING FUNCTION: CPT

## 2019-02-23 PROCEDURE — 97110 THERAPEUTIC EXERCISES: CPT

## 2019-02-23 PROCEDURE — 74220 X-RAY XM ESOPHAGUS 1CNTRST: CPT

## 2019-02-23 PROCEDURE — 88305 TISSUE EXAM BY PATHOLOGIST: CPT

## 2019-02-23 PROCEDURE — 86850 RBC ANTIBODY SCREEN: CPT

## 2019-02-23 PROCEDURE — 74177 CT ABD & PELVIS W/CONTRAST: CPT

## 2019-02-23 PROCEDURE — 82550 ASSAY OF CK (CPK): CPT

## 2019-02-23 PROCEDURE — 84145 PROCALCITONIN (PCT): CPT

## 2019-02-23 PROCEDURE — 84100 ASSAY OF PHOSPHORUS: CPT

## 2019-02-23 PROCEDURE — 83735 ASSAY OF MAGNESIUM: CPT

## 2019-02-23 PROCEDURE — 86900 BLOOD TYPING SEROLOGIC ABO: CPT

## 2019-02-23 PROCEDURE — 82272 OCCULT BLD FECES 1-3 TESTS: CPT

## 2019-02-23 PROCEDURE — 84436 ASSAY OF TOTAL THYROXINE: CPT

## 2019-02-23 PROCEDURE — 80053 COMPREHEN METABOLIC PANEL: CPT

## 2019-02-23 PROCEDURE — 82962 GLUCOSE BLOOD TEST: CPT

## 2019-02-23 PROCEDURE — 82607 VITAMIN B-12: CPT

## 2019-02-23 PROCEDURE — 96374 THER/PROPH/DIAG INJ IV PUSH: CPT | Mod: XU

## 2019-02-23 PROCEDURE — 86901 BLOOD TYPING SEROLOGIC RH(D): CPT

## 2019-02-23 PROCEDURE — 93005 ELECTROCARDIOGRAM TRACING: CPT

## 2019-02-23 PROCEDURE — 97162 PT EVAL MOD COMPLEX 30 MIN: CPT

## 2019-02-23 PROCEDURE — 85027 COMPLETE CBC AUTOMATED: CPT

## 2019-02-23 PROCEDURE — 80048 BASIC METABOLIC PNL TOTAL CA: CPT

## 2019-02-23 PROCEDURE — 94640 AIRWAY INHALATION TREATMENT: CPT

## 2019-02-23 PROCEDURE — 81001 URINALYSIS AUTO W/SCOPE: CPT

## 2019-02-23 PROCEDURE — 71260 CT THORAX DX C+: CPT

## 2019-02-23 PROCEDURE — 93970 EXTREMITY STUDY: CPT

## 2019-02-23 RX ADMIN — FLUCONAZOLE 50 MILLIGRAM(S): 150 TABLET ORAL at 11:17

## 2019-02-23 RX ADMIN — PANTOPRAZOLE SODIUM 40 MILLIGRAM(S): 20 TABLET, DELAYED RELEASE ORAL at 05:23

## 2019-02-23 RX ADMIN — Medication 1 GRAM(S): at 05:23

## 2019-02-23 RX ADMIN — Medication 3 MILLILITER(S): at 05:26

## 2019-02-23 RX ADMIN — Medication 50 MICROGRAM(S): at 05:23

## 2019-02-23 RX ADMIN — HEPARIN SODIUM 5000 UNIT(S): 5000 INJECTION INTRAVENOUS; SUBCUTANEOUS at 05:27

## 2019-02-23 RX ADMIN — Medication 3 MILLILITER(S): at 11:17

## 2019-02-23 RX ADMIN — Medication 1 GRAM(S): at 11:17

## 2019-02-23 NOTE — PROGRESS NOTE ADULT - SUBJECTIVE AND OBJECTIVE BOX
CARDIOLOGY     PROGRESS  NOTE   ________________________________________________    CHIEF COMPLAINT:Patient is a 89y old  Female who presents with a chief complaint of le edema/failure to thrive (22 Feb 2019 15:28)  doing better, more awake.  	  REVIEW OF SYSTEMS:  CONSTITUTIONAL: No fever, weight loss, or fatigue  EYES: No eye pain, visual disturbances, or discharge  ENT:  No difficulty hearing, tinnitus, vertigo; No sinus or throat pain  NECK: No pain or stiffness  RESPIRATORY: No cough, wheezing, chills or hemoptysis; No Shortness of Breath  CARDIOVASCULAR: No chest pain, palpitations, passing out, dizziness, or leg swelling  GASTROINTESTINAL: No abdominal or epigastric pain. No nausea, vomiting, or hematemesis; No diarrhea or constipation. No melena or hematochezia.  GENITOURINARY: No dysuria, frequency, hematuria, or incontinence  NEUROLOGICAL: No headaches, memory loss, loss of strength, numbness, or tremors  SKIN: No itching, burning, rashes, or lesions   LYMPH Nodes: No enlarged glands  ENDOCRINE: No heat or cold intolerance; No hair loss  MUSCULOSKELETAL: No joint pain or swelling; No muscle, back, or extremity pain  PSYCHIATRIC: No depression, anxiety, mood swings, or difficulty sleeping  HEME/LYMPH: No easy bruising, or bleeding gums  ALLERGY AND IMMUNOLOGIC: No hives or eczema	    [ ] All others negative	  [x ] Unable to obtain    PHYSICAL EXAM:  T(C): 36.3 (02-23-19 @ 04:45), Max: 36.4 (02-22-19 @ 16:22)  HR: 98 (02-23-19 @ 04:45) (89 - 110)  BP: 113/54 (02-23-19 @ 04:45) (95/53 - 113/54)  RR: 17 (02-23-19 @ 04:45) (17 - 18)  SpO2: 99% (02-23-19 @ 04:45) (93% - 99%)  Wt(kg): --  I&O's Summary    22 Feb 2019 07:01  -  23 Feb 2019 07:00  --------------------------------------------------------  IN: 340 mL / OUT: 500 mL / NET: -160 mL        Appearance: Normal	  HEENT:   Normal oral mucosa, PERRL, EOMI	  Lymphatic: No lymphadenopathy  Cardiovascular: Normal S1 S2, No JVD, + murmurs, No edema  Respiratory: Lungs clear to auscultation	  Psychiatry: A & O x 3, Mood & affect appropriate  Gastrointestinal:  Soft, Non-tender, + BS	  Skin: No rashes, No ecchymoses, No cyanosis	  Neurologic: Non-focal  Extremities: Normal range of motion, No clubbing, cyanosis or edema  Vascular: Peripheral pulses palpable 2+ bilaterally    MEDICATIONS  (STANDING):  ALBUTerol/ipratropium for Nebulization 3 milliLiter(s) Nebulizer every 6 hours  fluconAZOLE IVPB 100 milliGRAM(s) IV Intermittent every 24 hours  folic acid 1 milliGRAM(s) Oral daily  heparin  Injectable 5000 Unit(s) SubCutaneous every 12 hours  levothyroxine 50 MICROGram(s) Oral daily  pantoprazole    Tablet 40 milliGRAM(s) Oral two times a day  sucralfate suspension 1 Gram(s) Oral four times a day      TELEMETRY: 	    ECG:  	  RADIOLOGY:  OTHER: 	  	  LABS:	 	    CARDIAC MARKERS:            02-23    147<H>  |  110<H>  |  23  ----------------------------<  82  3.9   |  26  |  0.95    Ca    9.3      23 Feb 2019 06:28  Mg     1.8     02-23      proBNP: Serum Pro-Brain Natriuretic Peptide: 2551 pg/mL (02-14 @ 03:31)    Lipid Profile:   HgA1c:   TSH: Thyroid Stimulating Hormone, Serum: 5.70 uIU/mL (02-14 @ 05:28)          Assessment and plan  ---------------------------  doing better  pt with increase po intake  pt needs assistance with all her meals  nutrition  no cardiac work up  dc planning

## 2019-02-23 NOTE — PROGRESS NOTE ADULT - REASON FOR ADMISSION
le edema/failure to thrive

## 2019-02-23 NOTE — PROGRESS NOTE ADULT - PROVIDER SPECIALTY LIST ADULT
Cardiology
Gastroenterology
Internal Medicine
Psychiatry
Cardiology
Internal Medicine

## 2019-02-23 NOTE — PROGRESS NOTE ADULT - SUBJECTIVE AND OBJECTIVE BOX
PRISCILLA RICHARDSON:[id number],   89yFemale followed for:  No Known Drug Allergies  shellfish (Anaphylaxis)    PAST MEDICAL & SURGICAL HISTORY:  Stomach cancer: 2003  Kidney stone  Hypothyroid  History of ovarian cyst: 2009    FAMILY HISTORY:    MEDICATIONS  (STANDING):  ALBUTerol/ipratropium for Nebulization 3 milliLiter(s) Nebulizer every 6 hours  fluconAZOLE IVPB 100 milliGRAM(s) IV Intermittent every 24 hours  folic acid 1 milliGRAM(s) Oral daily  heparin  Injectable 5000 Unit(s) SubCutaneous every 12 hours  levothyroxine 50 MICROGram(s) Oral daily  pantoprazole    Tablet 40 milliGRAM(s) Oral two times a day  sucralfate suspension 1 Gram(s) Oral four times a day    MEDICATIONS  (PRN):  senna 2 Tablet(s) Oral at bedtime PRN Constipation      Vital Signs Last 24 Hrs  T(C): 36.3 (23 Feb 2019 04:45), Max: 36.4 (22 Feb 2019 16:22)  T(F): 97.3 (23 Feb 2019 04:45), Max: 97.5 (22 Feb 2019 16:22)  HR: 98 (23 Feb 2019 04:45) (89 - 110)  BP: 113/54 (23 Feb 2019 04:45) (95/53 - 113/54)  BP(mean): --  RR: 17 (23 Feb 2019 04:45) (17 - 18)  SpO2: 99% (23 Feb 2019 04:45) (93% - 99%)  nc/at  s1s2  cta  soft, nt, nd no guarding or rebound  no c/c/e    CBC Full  -  ( 23 Feb 2019 07:47 )  WBC Count : 5.33 K/uL  Hemoglobin : 11.6 g/dL  Hematocrit : 38.0 %  Platelet Count - Automated : 212 K/uL  Mean Cell Volume : 99.7 fl  Mean Cell Hemoglobin : 30.4 pg  Mean Cell Hemoglobin Concentration : 30.5 gm/dL  Auto Neutrophil # : x  Auto Lymphocyte # : x  Auto Monocyte # : x  Auto Eosinophil # : x  Auto Basophil # : x  Auto Neutrophil % : x  Auto Lymphocyte % : x  Auto Monocyte % : x  Auto Eosinophil % : x  Auto Basophil % : x    02-23    147<H>  |  110<H>  |  23  ----------------------------<  82  3.9   |  26  |  0.95    Ca    9.3      23 Feb 2019 06:28  Mg     1.8     02-23

## 2020-11-04 NOTE — ED PROVIDER NOTE - PROGRESS NOTE DETAILS
Body Location Override (Optional - Billing Will Still Be Based On Selected Body Map Location If Applicable): left lateral upper back PGY1/MD Kennedy. Pt does not want to swallow water. No signs of choking or strider sounds. Not clear whether it is functional or psychological. Guanic positive but, hemodynamically stable, no visible melena or hematochesia. Possibly gastritis, anastomosis ulcer, that bothers her appetite. admission for GI bleed, failure to thrive, dysphasia. No signs of infection. PMD is Dr. Oreilly, MIREYA Ashby. covers his pt today. Admission was accepted by Dr. Green. PGY1/MD Kennedy. received call from KANDACE Vasquez. accepted physician from Dr. Oreilly's group. changes accepted physician from Dr. Green to Dr. Urias. PGY1/MD Kennedy. Pt does not want to swallow water. No signs of choking or strider sounds. Not clear whether it is functional or psychological. Guanic positive but, hemodynamically stable, no visible melena or hematochesia. Possibly gastritis, anastomosis ulcer, that bothers her appetite. admission for GI bleed, failure to thrive, dysphagia. No signs of infection. PMD is Dr. Oreilly, MIREYA Ashby. covers his pt today. Admission was accepted by Dr. Green.

## 2021-04-19 NOTE — DIETITIAN INITIAL EVALUATION ADULT. - SOURCE
Pt resting in bed comfortably at this time, alert and oriented times 4. No distress noted, respirations even and unlabored on 2 L NC. Pt denies pain at this time. Pt instructed to call for assistance if needed, call light in place, will continue to monitor. Medical record; RN/other (specify)

## 2022-07-21 NOTE — GOALS OF CARE CONVERSATION - PERSONAL ADVANCE DIRECTIVE - FAMILY/CHILD(REN)
Called and left a message with Jose to see if pt still has services open with them.     Emailed Rajwinder Andrews to see if she is still working with pt.    Jessica - son and daughter-in-law

## 2022-08-28 NOTE — PATIENT PROFILE ADULT - VISION (WITH CORRECTIVE LENSES IF THE PATIENT USUALLY WEARS THEM):
Partially impaired: cannot see medication labels or newsprint, but can see obstacles in path, and the surrounding layout; can count fingers at arm's length Left UE Active ROM was WFL (within functional limits)/Right UE Active ROM was WFL (within functional limits)/Left LE Active ROM was WFL (within functional limits)/Right LE Active ROM was WFL (within functional limits)

## 2024-01-01 NOTE — CONSULT NOTE ADULT - PROBLEM SELECTOR RECOMMENDATION 4
Pt is DNR  Will hold a family meeting as soon as possible.     Wait for attending recommendations. Goal of care discussion hold with family: Hector (), Hector (Son), Beth (daughter in law). Family member will refer [facility] and MOLST was filled out; family members provided with Original.   Will refer to Hospice. Goal of care discussion hold with family: Hector (), Hector (Son), Beth (daughter in law). Family member will prefer comfort care and MOLST was filled out; family members provided with Original.   Will refer to Hospice. 2